# Patient Record
Sex: FEMALE | Race: WHITE | NOT HISPANIC OR LATINO | Employment: OTHER
[De-identification: names, ages, dates, MRNs, and addresses within clinical notes are randomized per-mention and may not be internally consistent; named-entity substitution may affect disease eponyms.]

---

## 2017-02-14 ENCOUNTER — RECORDS - HEALTHEAST (OUTPATIENT)
Dept: ADMINISTRATIVE | Facility: OTHER | Age: 62
End: 2017-02-14

## 2017-02-22 ENCOUNTER — COMMUNICATION - HEALTHEAST (OUTPATIENT)
Dept: TELEHEALTH | Facility: CLINIC | Age: 62
End: 2017-02-22

## 2017-02-22 ENCOUNTER — RECORDS - HEALTHEAST (OUTPATIENT)
Dept: ADMINISTRATIVE | Facility: OTHER | Age: 62
End: 2017-02-22

## 2017-02-22 ENCOUNTER — RECORDS - HEALTHEAST (OUTPATIENT)
Dept: BONE DENSITY | Facility: CLINIC | Age: 62
End: 2017-02-22

## 2017-02-22 DIAGNOSIS — M85.80 OTHER SPECIFIED DISORDERS OF BONE DENSITY AND STRUCTURE, UNSPECIFIED SITE: ICD-10-CM

## 2017-05-23 ENCOUNTER — RECORDS - HEALTHEAST (OUTPATIENT)
Dept: ADMINISTRATIVE | Facility: OTHER | Age: 62
End: 2017-05-23

## 2017-06-06 ENCOUNTER — RECORDS - HEALTHEAST (OUTPATIENT)
Dept: ADMINISTRATIVE | Facility: OTHER | Age: 62
End: 2017-06-06

## 2017-06-06 ASSESSMENT — MIFFLIN-ST. JEOR: SCORE: 1639.51

## 2017-06-11 ENCOUNTER — ANESTHESIA - HEALTHEAST (OUTPATIENT)
Dept: SURGERY | Facility: CLINIC | Age: 62
End: 2017-06-11

## 2017-06-12 ENCOUNTER — SURGERY - HEALTHEAST (OUTPATIENT)
Dept: SURGERY | Facility: CLINIC | Age: 62
End: 2017-06-12

## 2017-06-19 ENCOUNTER — THERAPY VISIT (OUTPATIENT)
Dept: PHYSICAL THERAPY | Facility: CLINIC | Age: 62
End: 2017-06-19
Payer: COMMERCIAL

## 2017-06-19 DIAGNOSIS — Z47.1 AFTERCARE FOLLOWING RIGHT KNEE JOINT REPLACEMENT SURGERY: Primary | ICD-10-CM

## 2017-06-19 DIAGNOSIS — Z96.651 AFTERCARE FOLLOWING RIGHT KNEE JOINT REPLACEMENT SURGERY: Primary | ICD-10-CM

## 2017-06-19 PROCEDURE — 97110 THERAPEUTIC EXERCISES: CPT | Mod: GP | Performed by: PHYSICAL THERAPIST

## 2017-06-19 PROCEDURE — 97161 PT EVAL LOW COMPLEX 20 MIN: CPT | Mod: GP | Performed by: PHYSICAL THERAPIST

## 2017-06-19 PROCEDURE — 97530 THERAPEUTIC ACTIVITIES: CPT | Mod: GP | Performed by: PHYSICAL THERAPIST

## 2017-06-19 ASSESSMENT — ACTIVITIES OF DAILY LIVING (ADL)
SWELLING: THE SYMPTOM PREVENTS ME FROM ALL DAILY ACTIVITIES
WEAKNESS: THE SYMPTOM PREVENTS ME FROM ALL DAILY ACTIVITIES
GIVING WAY, BUCKLING OR SHIFTING OF KNEE: THE SYMPTOM PREVENTS ME FROM ALL DAILY ACTIVITIES
GO DOWN STAIRS: I AM UNABLE TO DO THE ACTIVITY
GO UP STAIRS: I AM UNABLE TO DO THE ACTIVITY
RAW_SCORE: 0
LIMPING: THE SYMPTOM PREVENTS ME FROM ALL DAILY ACTIVITIES
KNEEL ON THE FRONT OF YOUR KNEE: I AM UNABLE TO DO THE ACTIVITY
PAIN: THE SYMPTOM PREVENTS ME FROM ALL DAILY ACTIVITIES
KNEE_ACTIVITY_OF_DAILY_LIVING_SUM: 0
SIT WITH YOUR KNEE BENT: I AM UNABLE TO DO THE ACTIVITY
WALK: I AM UNABLE TO DO THE ACTIVITY
SQUAT: I AM UNABLE TO DO THE ACTIVITY
STIFFNESS: THE SYMPTOM PREVENTS ME FROM ALL DAILY ACTIVITIES
STAND: I AM UNABLE TO DO THE ACTIVITY
HOW_WOULD_YOU_RATE_THE_CURRENT_FUNCTION_OF_YOUR_KNEE_DURING_YOUR_USUAL_DAILY_ACTIVITIES_ON_A_SCALE_FROM_0_TO_100_WITH_100_BEING_YOUR_LEVEL_OF_KNEE_FUNCTION_PRIOR_TO_YOUR_INJURY_AND_0_BEING_THE_INABILITY_TO_PERFORM_ANY_OF_YOUR_USUAL_DAILY_ACTIVITIES?: 30
KNEE_ACTIVITY_OF_DAILY_LIVING_SCORE: 0
RISE FROM A CHAIR: I AM UNABLE TO DO THE ACTIVITY
HOW_WOULD_YOU_RATE_THE_OVERALL_FUNCTION_OF_YOUR_KNEE_DURING_YOUR_USUAL_DAILY_ACTIVITIES?: SEVERELY ABNORMAL
AS_A_RESULT_OF_YOUR_KNEE_INJURY,_HOW_WOULD_YOU_RATE_YOUR_CURRENT_LEVEL_OF_DAILY_ACTIVITY?: SEVERELY ABNORMAL

## 2017-06-19 NOTE — MR AVS SNAPSHOT
After Visit Summary   6/19/2017    Rachel Penaloza    MRN: [id number]           Patient Information     Date Of Birth          1955        Visit Information        Provider Department      6/19/2017 4:40 PM Ruchi Alexander PT [facility] Physical Therapy        Today's Diagnoses     Aftercare following right knee joint replacement surgery    -  1       Follow-ups after your visit        Your next 10 appointments already scheduled     Jun 22, 2017 10:10 AM CDT   DIVINA Extremity with Ruchi Alexander PT   [facility] Physical Therapy ([facility]  )    [address] [place] [id number] [phone]            Jun 26, 2017 10:10 AM CDT   DIVINA Extremity with Ruchi Alexander PT   [facility] Therapy ([facility]  )    [address] [place] [id number] [phone]            Jun 29, 2017  2:40 PM CDT   DIVINA Extremity with Ruchi Alexander PT   [facility] Physical Therapy ([facility]  )    [address] [place] [id number] [phone]              Who to contact     If you have questions or need follow up information about today's clinic visit or your schedule please contact [facility] PHYSICAL THERAPY directly at [phone].  Normal or non-critical lab and imaging results will be communicated to you by MyChart, letter or phone within 4 business days after the clinic has received the results. If you do not hear from us within 7 days, please contact the clinic through MyChart or phone. If you have a critical or abnormal lab result, we will notify you by phone as soon as possible.  Submit refill requests through [employer] or call your pharmacy and they will forward the refill request to us. Please allow 3 business days for your refill to be completed.          Additional  "Information About Your Visit        MyChart Information     D[employer] lets you send messages to your doctor, view your test results, renew your prescriptions, schedule appointments and more. To sign up, go to [email or web address] . Click on \"Log in\" on the left side of the screen, which will take you to the Welcome page. Then click on \"Sign up Now\" on the right side of the page.     You will be asked to enter the access code listed below, as well as some personal information. Please follow the directions to create your username and password.     Your access code is: [email or web address]  Expires: 2017  8:48 AM     Your access code will  in 90 days. If you need help or a new code, please call your [place] clinic or [phone].        Care EveryWhere ID     This is your Care EveryWhere ID. This could be used by other organizations to access your [place] medical records  [id number]         Blood Pressure from Last 3 Encounters:   No data found for BP    Weight from Last 3 Encounters:   No data found for Wt              We Performed the Following     DIVINA Inital Eval Report     PT Eval, Low Complexity (75610)     Therapeutic Activities     Therapeutic Exercises        Primary Care Provider    None Specified       No primary provider on file.        Thank you!     Thank you for choosing [facility] PHYSICAL THERAPY  for your care. Our goal is always to provide you with excellent care. Hearing back from our patients is one way we can continue to improve our services. Please take a few minutes to complete the written survey that you may receive in the mail after your visit with us. Thank you!             Your Updated Medication List - Protect others around you: Learn how to safely use, store and throw away your medicines at www.disposemymeds.org.      Notice  As of 2017 11:59 PM    You have not been prescribed any medications.      "

## 2017-06-19 NOTE — LETTER
[facility] A[facility] PHYSICAL THERAPY  [address] [place] [id number] [phone]    2017    Re: Rachel Penaloza   :   1955  MRN:  [id number]   REFERRING PHYSICIAN:   Zachary Parker    [facility]LETIC [facility] PHYSICAL [facility]    Date of Initial Evaluation:  2017  Visits:  Rxs Used: 1  Reason for Referral:  Aftercare following right knee joint replacement surgery    EVALUATION SUMMARY    [facility] Initial Evaluation    Subjective:    Patient is a 61 year old female presenting with rehab right knee hpi.   Rachel Penaloza is a 61 year old female with a right knee condition.  Condition occurred with:  Degenerative joint disease (Pt. is s/p R TKA  due to progressive OA. Pt. is s/p L TKA in . She has chronic OA in multiple joints and spine as well as fibromyalgia. Pt. feels she is progressing well thus far since surgery.).  Condition occurred: for unknown reasons.  This is a new condition  17.    Patient reports pain:  Medial.  Radiates to:  Lower leg.  Pain is described as aching and sharp Episode frequency: aching is constant; sharp pain is intermittent. and reported as 9/10.  Associated symptoms:  Edema, loss of motion/stiffness and loss of strength (Pt. has chronic pitting edema in both lower legs and feet). Pain is the same all the time.  Symptoms are exacerbated by standing, walking, ascending stairs and descending stairs and relieved by ice, analgesics and rest.  Since onset symptoms are gradually improving.    Previous treatment includes physical therapy (inpatient).  There was mild improvement following previous treatment.  General health as reported by patient is poor.                   Pertinent medical history includes:  Osteoarthritis, depression, fibromyalgia and sleep disorder/apnea.  Medical allergies: yes (Latex).  Other surgeries include:  None reported.      Objective:    Standing  Alignment:    Knee:  Normal  Gait:  Mod limp on R  Gait Type:  Antalgic   Assistive Devices:  Cane  Deviations:  Knee:  Knee flexion decr R  Flexibility/Screens:   Positive screens:  Knee  Lower Extremity:  Decreased right lower extremity flexibility:  Quadriceps; Hamstrings and Gastroc     Knee Evaluation:  ROM:    AROM  Hyperextension:  Left:  0    Right: 0  Extension:  Left: 0    Right:  2  Flexion: Left: 120    Right: 92  PROM  Hyperextension: Left:   Right:  0  Extension: Left:   Right:  0  Flexion: Left:   Right:  98  Strength:   Extension:  Right: 4-/5   Pain:  Flexion:  Right: 4-/5   Pain:    Quad Set Right: Fair    Pain:  Ligament Testing:  Not Assessed  Special Tests:   Right knee negative for the following special tests:  Patellar Tracking-Abduction Medial and Patellar Tracking-Abduction Lateral  Palpation:    Right knee tenderness present at:  Medial Joint Line  Edema:  Edema of the knee: marked edema R knee.  Mobility Testing:    Proximal Tib-Fib:  Right: hypomobile    Assessment/Plan:      Patient is a 61 year old female with right side knee complaints.    Patient has the following significant findings with corresponding treatment plan.                Diagnosis 1:  S/p R TKA  Pain -  hot/cold therapy, self management and education  Decreased ROM/flexibility - manual therapy and therapeutic exercise  Decreased strength - therapeutic exercise and therapeutic activities  Impaired muscle performance - neuro re-education  Decreased function - therapeutic activities    Therapy Evaluation Codes:   1) History comprised of:   Personal factors that impact the plan of care:      None.    Comorbidity factors that impact the plan of care are:      Fibromyalgia and Overweight.     Medications impacting care: None.  2) Examination of Body Systems comprised of:   Body structures and functions that impact the plan of care:      Knee.   Activity limitations that impact the plan of care are:      Standing and  Walking.  3) Clinical presentation characteristics are:   Stable/Uncomplicated.  4) Decision-Making    Low complexity using standardized patient assessment instrument and/or measureable assessment of functional outcome.  Cumulative Therapy Evaluation is: Low complexity.    Previous and current functional limitations:  (See Goal Flow Sheet for this information)    Short term and Long term goals: (See Goal Flow Sheet for this information)     Communication ability:  Patient appears to be able to clearly communicate and understand verbal and written communication and follow directions correctly.  Treatment Explanation - The following has been discussed with the patient:   RX ordered/plan of care  Anticipated outcomes  Possible risks and side effects  This patient would benefit from PT intervention to resume normal activities.   Rehab potential is good.    Frequency:  2 X week, once daily  Duration:  for 3 weeks tapering to 1 X a week over 4 weeks  Discharge Plan:  Achieve all LTG.  Independent in home treatment program.  Reach maximal therapeutic benefit.    Thank you for your referral.    INQUIRIES  Therapist: Ruchi Alexander PT  [facility] PH[facility]ICAL THER[facility]  [address] [place] [id number]  Phone: [phone]  Fax: [phone]

## 2017-06-20 PROBLEM — Z47.1 AFTERCARE FOLLOWING RIGHT KNEE JOINT REPLACEMENT SURGERY: Status: ACTIVE | Noted: 2017-06-20

## 2017-06-20 PROBLEM — Z96.651 AFTERCARE FOLLOWING RIGHT KNEE JOINT REPLACEMENT SURGERY: Status: ACTIVE | Noted: 2017-06-20

## 2017-06-20 NOTE — PROGRESS NOTES
Subjective:    Patient is a 61 year old female presenting with rehab left ankle/foot hpi.                                      Pertinent medical history includes:  Osteoarthritis, depression, fibromyalgia and sleep disorder/apnea.  Medical allergies: yes (Latex).  Other surgeries include:  None reported.                                        Objective:    System    Physical Exam    General     ROS    Assessment/Plan:

## 2017-06-20 NOTE — PROGRESS NOTES
[facility] Initial Evaluation          Subjective:    Patient is a 61 year old female presenting with rehab right knee hpi.   Rachel Penaloza is a 61 year old female with a right knee condition.  Condition occurred with:  Degenerative joint disease (Pt. is s/p R TKA 6-12 due to progressive OA. Pt. is s/p L TKA in 2010. She has chronic OA in multiple joints and spine as well as fibromyalgia. Pt. feels she is progressing well thus far since surgery.).  Condition occurred: for unknown reasons.  This is a new condition  6-12-17.    Patient reports pain:  Medial.  Radiates to:  Lower leg.  Pain is described as aching and sharp Episode frequency: aching is constant; sharp pain is intermittent. and reported as 9/10.  Associated symptoms:  Edema, loss of motion/stiffness and loss of strength (Pt. has chronic pitting edema in both lower legs and feet). Pain is the same all the time.  Symptoms are exacerbated by standing, walking, ascending stairs and descending stairs and relieved by ice, analgesics and rest.  Since onset symptoms are gradually improving.    Previous treatment includes physical therapy (inpatient).  There was mild improvement following previous treatment.  General health as reported by patient is poor.                                              Objective:    Standing Alignment:              Knee:  Normal      Gait:  Mod limp on R  Gait Type:  Antalgic   Assistive Devices:  Cane  Deviations:  Knee:  Knee flexion decr R    Flexibility/Screens:   Positive screens:  Knee    Lower Extremity:      Decreased right lower extremity flexibility:  Quadriceps; Hamstrings and Gastroc                                                      Knee Evaluation:  ROM:    AROM    Hyperextension:  Left:  0    Right: 0  Extension:  Left: 0    Right:  2  Flexion: Left: 120    Right: 92  PROM    Hyperextension: Left:   Right:  0  Extension: Left:   Right:  0  Flexion: Left:   Right:  98      Strength:      Extension:  Right: 4-/5   Pain:  Flexion:  Right: 4-/5   Pain:      Quad Set Right: Fair    Pain:  Ligament Testing:  Not Assessed                Special Tests:       Right knee negative for the following special tests:  Patellar Tracking-Abduction Medial and Patellar Tracking-Abduction Lateral  Palpation:      Right knee tenderness present at:  Medial Joint Line  Edema:  Edema of the knee: marked edema R knee.    Mobility Testing:    Proximal Tib-Fib:  Right: hypomobile                General     ROS    Assessment/Plan:      Patient is a 61 year old female with right side knee complaints.    Patient has the following significant findings with corresponding treatment plan.                Diagnosis 1:  S/p R TKA  Pain -  hot/cold therapy, self management and education  Decreased ROM/flexibility - manual therapy and therapeutic exercise  Decreased strength - therapeutic exercise and therapeutic activities  Impaired muscle performance - neuro re-education  Decreased function - therapeutic activities    Therapy Evaluation Codes:   1) History comprised of:   Personal factors that impact the plan of care:      None.    Comorbidity factors that impact the plan of care are:      Fibromyalgia and Overweight.     Medications impacting care: None.  2) Examination of Body Systems comprised of:   Body structures and functions that impact the plan of care:      Knee.   Activity limitations that impact the plan of care are:      Standing and Walking.  3) Clinical presentation characteristics are:   Stable/Uncomplicated.  4) Decision-Making    Low complexity using standardized patient assessment instrument and/or measureable assessment of functional outcome.  Cumulative Therapy Evaluation is: Low complexity.    Previous and current functional limitations:  (See Goal Flow Sheet for this information)    Short term and Long term goals: (See Goal Flow Sheet for this information)     Communication ability:  Patient appears to be  able to clearly communicate and understand verbal and written communication and follow directions correctly.  Treatment Explanation - The following has been discussed with the patient:   RX ordered/plan of care  Anticipated outcomes  Possible risks and side effects  This patient would benefit from PT intervention to resume normal activities.   Rehab potential is good.    Frequency:  2 X week, once daily  Duration:  for 3 weeks tapering to 1 X a week over 4 weeks  Discharge Plan:  Achieve all LTG.  Independent in home treatment program.  Reach maximal therapeutic benefit.    Please refer to the daily flowsheet for treatment today, total treatment time and time spent performing 1:1 timed codes.

## 2017-06-22 ENCOUNTER — THERAPY VISIT (OUTPATIENT)
Dept: PHYSICAL THERAPY | Facility: CLINIC | Age: 62
End: 2017-06-22
Payer: COMMERCIAL

## 2017-06-22 DIAGNOSIS — Z96.651 AFTERCARE FOLLOWING RIGHT KNEE JOINT REPLACEMENT SURGERY: ICD-10-CM

## 2017-06-22 DIAGNOSIS — Z47.1 AFTERCARE FOLLOWING RIGHT KNEE JOINT REPLACEMENT SURGERY: ICD-10-CM

## 2017-06-22 PROCEDURE — 97110 THERAPEUTIC EXERCISES: CPT | Mod: GP | Performed by: PHYSICAL THERAPIST

## 2017-06-22 PROCEDURE — 97140 MANUAL THERAPY 1/> REGIONS: CPT | Mod: GP | Performed by: PHYSICAL THERAPIST

## 2017-06-26 ENCOUNTER — THERAPY VISIT (OUTPATIENT)
Dept: PHYSICAL THERAPY | Facility: CLINIC | Age: 62
End: 2017-06-26
Payer: COMMERCIAL

## 2017-06-26 DIAGNOSIS — Z96.651 AFTERCARE FOLLOWING RIGHT KNEE JOINT REPLACEMENT SURGERY: ICD-10-CM

## 2017-06-26 DIAGNOSIS — Z47.1 AFTERCARE FOLLOWING RIGHT KNEE JOINT REPLACEMENT SURGERY: ICD-10-CM

## 2017-06-26 PROCEDURE — 97110 THERAPEUTIC EXERCISES: CPT | Mod: GP | Performed by: PHYSICAL THERAPIST

## 2017-06-26 PROCEDURE — 97140 MANUAL THERAPY 1/> REGIONS: CPT | Mod: GP | Performed by: PHYSICAL THERAPIST

## 2017-06-26 NOTE — PROGRESS NOTES
Subjective:    HPI                    Objective:    System    Physical Exam    General     ROS    Assessment/Plan:      SUBJECTIVE  Subjective changes as noted by pt:   Pt. reports doing alot of activity on friday and saturday. The knee needed a break yesterday as a result so she took it very easy yesterday. She feels much better today.   Current pain level:  6/10   Changes in function:  Yes (See Goal flowsheet attached for changes in current functional level)     Adverse reaction to treatment or activity:  None    OBJECTIVE  Changes in objective findings: AROMR 0-0-100; PROM R 0-0-109. Strength R quad 4-/5; hams 4/5     ASSESSMENT  Rachel continues to require intervention to meet STG and LTG's: PT  Patient's symptoms are resolving.  Response to therapy has shown an improvement in  pain level and ROM   Progress made towards STG/LTG?  Yes (See Goal flowsheet attached for updates on achievement of STG and LTG)    PLAN  Current treatment program is being advanced to more complex exercises.    PTA/ATC plan:  N/A    Please refer to the daily flowsheet for treatment today, total treatment time and time spent performing 1:1 timed codes.

## 2017-06-26 NOTE — MR AVS SNAPSHOT
After Visit Summary   6/26/2017    Rachel Penaloza    MRN: [id number]           Patient Information     Date Of Birth          1955        Visit Information        Provider Department      6/26/2017 10:10 AM Ruchi Alexander PT [facility] Physical Therapy        Today's Diagnoses     Aftercare following right knee joint replacement surgery           Follow-ups after your visit        Your next 10 appointments already scheduled     Jun 29, 2017  2:40 PM CDT   DIVINA Extremity with Ruchi Alexander PT   [facility] Physical Therapy ([facility]  )    [address] [place] [id number]   [phone]            Jul 03, 2017 10:10 AM CDT   DIVINA Extremity with Ruchi Alexander PT   [facility] Physical Therapy ([facility]  )    [address] [place] [id number] [phone]            Jul 07, 2017 10:50 AM CDT   DIVINA Extremity with Ruchi Alexander PT   [facility] Physical Therapy ([facility]  )    [address] [place] [id number] [phone]              Who to contact     If you have questions or need follow up information about today's clinic visit or your schedule please contact [facility] PHYSICAL THERAPY directly at [phone].  Normal or non-critical lab and imaging results will be communicated to you by MyChart, letter or phone within 4 business days after the clinic has received the results. If you do not hear from us within 7 days, please contact the clinic through MyChart or phone. If you have a critical or abnormal lab result, we will notify you by phone as soon as possible.  Submit refill requests through [employer] or call your pharmacy and they will forward the refill request to us. Please allow 3 business days for your refill to be completed.          Additional  "Information About Your Visit        MyChart Information     [employer] lets you send messages to your doctor, view your test results, renew your prescriptions, schedule appointments and more. To sign up, go to [email or web address] . Click on \"Log in\" on the left side of the screen, which will take you to the Welcome page. Then click on \"Sign up Now\" on the right side of the page.     You will be asked to enter the access code listed below, as well as some personal information. Please follow the directions to create your username and password.     Your access code is: [email or web address]  Expires: 2017  8:48 AM     Your access code will  in 90 days. If you need help or a new code, please call your [place] clinic or [phone].        Care EveryWhere ID     This is your Care EveryWhere ID. This could be used by other organizations to access your [place] medical records  [id number]         Blood Pressure from Last 3 Encounters:   No data found for BP    Weight from Last 3 Encounters:   No data found for Wt              We Performed the Following     Manual Ther Tech, 1+Regions, EA 15 min     Therapeutic Exercises        Primary Care Provider    None Specified       No primary provider on file.        Equal Access to Services     EDELMIRA CLAROS : Hadii jose Avery, wasuzanne angelo, qaadrianta kaalmada presley, ten evlazquez . So [facility] [phone].    ATENCIÓN: Si habla español, tiene a romano disposición servicios gratuitos de asistencia lingüística. Llame al [phone].    We comply with applicable federal civil rights laws and [place] laws. We do not discriminate on the basis of race, color, national origin, age, disability sex, sexual orientation or gender identity.            Thank you!     Thank you for choosing [facility] PHYSICAL THERAPY  for your care. Our goal is always to provide you with excellent care. Hearing back from our " patients is one way we can continue to improve our services. Please take a few minutes to complete the written survey that you may receive in the mail after your visit with us. Thank you!             Your Updated Medication List - Protect others around you: Learn how to safely use, store and throw away your medicines at www.disposemymeds.org.      Notice  As of 6/26/2017  1:34 PM    You have not been prescribed any medications.

## 2017-06-29 ENCOUNTER — THERAPY VISIT (OUTPATIENT)
Dept: PHYSICAL THERAPY | Facility: CLINIC | Age: 62
End: 2017-06-29
Payer: COMMERCIAL

## 2017-06-29 DIAGNOSIS — Z47.1 AFTERCARE FOLLOWING RIGHT KNEE JOINT REPLACEMENT SURGERY: ICD-10-CM

## 2017-06-29 DIAGNOSIS — Z96.651 AFTERCARE FOLLOWING RIGHT KNEE JOINT REPLACEMENT SURGERY: ICD-10-CM

## 2017-06-29 PROCEDURE — 97140 MANUAL THERAPY 1/> REGIONS: CPT | Mod: GP | Performed by: PHYSICAL THERAPIST

## 2017-06-29 PROCEDURE — 97110 THERAPEUTIC EXERCISES: CPT | Mod: GP | Performed by: PHYSICAL THERAPIST

## 2017-07-03 ENCOUNTER — THERAPY VISIT (OUTPATIENT)
Dept: PHYSICAL THERAPY | Facility: CLINIC | Age: 62
End: 2017-07-03
Payer: COMMERCIAL

## 2017-07-03 DIAGNOSIS — Z47.1 AFTERCARE FOLLOWING RIGHT KNEE JOINT REPLACEMENT SURGERY: ICD-10-CM

## 2017-07-03 DIAGNOSIS — Z96.651 AFTERCARE FOLLOWING RIGHT KNEE JOINT REPLACEMENT SURGERY: ICD-10-CM

## 2017-07-03 PROCEDURE — 97110 THERAPEUTIC EXERCISES: CPT | Mod: GP | Performed by: PHYSICAL THERAPIST

## 2017-07-03 PROCEDURE — 97140 MANUAL THERAPY 1/> REGIONS: CPT | Mod: GP | Performed by: PHYSICAL THERAPIST

## 2017-07-03 NOTE — PROGRESS NOTES
Subjective:    HPI                    Objective:    System    Physical Exam    General     ROS    Assessment/Plan:      SUBJECTIVE  Subjective changes as noted by pt:   Pt. reports increased swelling in the knee this weekend with increased soreness.   Current pain level:  6/10   Changes in function:  Yes (See Goal flowsheet attached for changes in current functional level)     Adverse reaction to treatment or activity:  None    OBJECTIVE  Changes in objective findings:  AROM R 0-0-101; PROM R 0-0-111.     ASSESSMENT  Rachel continues to require intervention to meet STG and LTG's: PT  Patient is progressing as expected.  Response to therapy has shown an improvement in  pain level and ROM   Progress made towards STG/LTG?  Yes (See Goal flowsheet attached for updates on achievement of STG and LTG)    PLAN  Current treatment program is being advanced to more complex exercises.    PTA/ATC plan:  N/A    Please refer to the daily flowsheet for treatment today, total treatment time and time spent performing 1:1 timed codes.

## 2017-07-03 NOTE — MR AVS SNAPSHOT
After Visit Summary   7/3/2017    Rachel Penaloza    MRN: [id number]           Patient Information     Date Of Birth          1955        Visit Information        Provider Department      7/3/2017 10:10 AM Ruchi Alexander, PT [facility] Physical Therapy        Today's Diagnoses     Aftercare following right knee joint replacement surgery           Follow-ups after your visit        Your next 10 appointments already scheduled     Jul 07, 2017 10:50 AM CDT   DIVINA Extremity with Ruchi Alexander, PT   [facility] ([facility]  )    [address] [place] [id number] [phone]            Jul 10, 2017 10:10 AM CDT   DIVINA Extremity with Ayla Pires PTA   [facility] ([facility]  )    [address] [place] [id number] [phone]            Jul 13, 2017 10:00 AM CDT   DIVINA Extremity with Su Gee PT   [facility] ([facility]  )    [address] [place] [id number] [phone]              Who to contact     If you have questions or need follow up information about today's clinic visit or your schedule please contact [facility] PHYSICAL THERAPY directly at [phone].  Normal or non-critical lab and imaging results will be communicated to you by MyChart, letter or phone within 4 business days after the clinic has received the results. If you do not hear from us within 7 days, please contact the clinic through MyChart or phone. If you have a critical or abnormal lab result, we will notify you by phone as soon as possible.  Submit refill requests through Colto or call your pharmacy and they will forward the refill request to us. Please allow 3 business days for your refill to be completed.          Additional Information About  "Your Visit        MyChart Information     [employer] lets you send messages to your doctor, view your test results, renew your prescriptions, schedule appointments and more. To sign up, go to [email or web address] . Click on \"Log in\" on the left side of the screen, which will take you to the Welcome page. Then click on \"Sign up Now\" on the right side of the page.     You will be asked to enter the access code listed below, as well as some personal information. Please follow the directions to create your username and password.     Your access code is: [email or web address]  Expires: 2017  8:48 AM     Your access code will  in 90 days. If you need help or a new code, please call your [place] clinic or [phone].        Care EveryWhere ID     This is your Care EveryWhere ID. This could be used by other organizations to access your [place] medical records  [id number]         Blood Pressure from Last 3 Encounters:   No data found for BP    Weight from Last 3 Encounters:   No data found for Wt              We Performed the Following     Manual Ther Tech, 1+Regions, EA 15 min     Therapeutic Exercises        Primary Care Provider    None Specified       No primary provider on file.        Equal Access to Services     [facility] : Hadii jose Avery, wanonida gi, qaadrianta kaalmada presley, ten velazquez . So [facility] [phone].    ATENCIÓN: Si habla español, tiene a romano disposición servicios gratuitos de asistencia lingüística. Jet al [phone].    We comply with applicable federal civil rights laws and [place] laws. We do not discriminate on the basis of race, color, national origin, age, disability sex, sexual orientation or gender identity.            Thank you!     Thank you for choosing [facility] PHYSICAL THERAPY  for your care. Our goal is always to provide you with excellent care. Hearing back from our patients is one way we " can continue to improve our services. Please take a few minutes to complete the written survey that you may receive in the mail after your visit with us. Thank you!             Your Updated Medication List - Protect others around you: Learn how to safely use, store and throw away your medicines at www.disposemymeds.org.      Notice  As of 7/3/2017  2:07 PM    You have not been prescribed any medications.

## 2017-07-07 ENCOUNTER — THERAPY VISIT (OUTPATIENT)
Dept: PHYSICAL THERAPY | Facility: CLINIC | Age: 62
End: 2017-07-07
Payer: COMMERCIAL

## 2017-07-07 DIAGNOSIS — Z47.1 AFTERCARE FOLLOWING RIGHT KNEE JOINT REPLACEMENT SURGERY: ICD-10-CM

## 2017-07-07 DIAGNOSIS — Z96.651 AFTERCARE FOLLOWING RIGHT KNEE JOINT REPLACEMENT SURGERY: ICD-10-CM

## 2017-07-07 PROCEDURE — 97140 MANUAL THERAPY 1/> REGIONS: CPT | Mod: GP | Performed by: PHYSICAL THERAPIST

## 2017-07-07 PROCEDURE — 97110 THERAPEUTIC EXERCISES: CPT | Mod: GP | Performed by: PHYSICAL THERAPIST

## 2017-07-10 ENCOUNTER — THERAPY VISIT (OUTPATIENT)
Dept: PHYSICAL THERAPY | Facility: CLINIC | Age: 62
End: 2017-07-10
Payer: COMMERCIAL

## 2017-07-10 DIAGNOSIS — Z47.1 AFTERCARE FOLLOWING RIGHT KNEE JOINT REPLACEMENT SURGERY: ICD-10-CM

## 2017-07-10 DIAGNOSIS — Z96.651 AFTERCARE FOLLOWING RIGHT KNEE JOINT REPLACEMENT SURGERY: ICD-10-CM

## 2017-07-10 PROCEDURE — 97110 THERAPEUTIC EXERCISES: CPT | Mod: GP

## 2017-07-10 PROCEDURE — 97112 NEUROMUSCULAR REEDUCATION: CPT | Mod: GP

## 2017-07-13 ENCOUNTER — THERAPY VISIT (OUTPATIENT)
Dept: PHYSICAL THERAPY | Facility: CLINIC | Age: 62
End: 2017-07-13
Payer: COMMERCIAL

## 2017-07-13 DIAGNOSIS — Z96.651 AFTERCARE FOLLOWING RIGHT KNEE JOINT REPLACEMENT SURGERY: ICD-10-CM

## 2017-07-13 DIAGNOSIS — Z47.1 AFTERCARE FOLLOWING RIGHT KNEE JOINT REPLACEMENT SURGERY: ICD-10-CM

## 2017-07-13 PROCEDURE — 97140 MANUAL THERAPY 1/> REGIONS: CPT | Mod: GP | Performed by: PHYSICAL THERAPIST

## 2017-07-13 PROCEDURE — 97112 NEUROMUSCULAR REEDUCATION: CPT | Mod: GP | Performed by: PHYSICAL THERAPIST

## 2017-07-13 PROCEDURE — 97110 THERAPEUTIC EXERCISES: CPT | Mod: GP | Performed by: PHYSICAL THERAPIST

## 2017-07-13 NOTE — LETTER
[facility] PHYSICAL THERAPY  [address] [place] [id number]  [phone]    2017    Re: Rachel Penaloza   :   1955  MRN:  [id number]   REFERRING PHYSICIAN:   Zachary Parker    [facility] ATHLETIC [facility] PHYSICAL [facility]    Date of Initial Evaluation:  2017  Visits:  Rxs Used: 8  Reason for Referral:  Aftercare following right knee joint replacement surgery    PROGRESS  REPORT  Progress reporting period is from 17 to 17.       SUBJECTIVE  Subjective changes noted by patient:  Continues to have significant pain and stiffness.  Subjective: Woke up especially stiff and sore this morning, but significant relief with manual techniques during today's session.    Current pain level is 9/10 Current Pain level: 9/10.     Previous pain level was  9/10 Initial Pain level: 9/10.   Changes in function:  Yes, tolerance for walking (with cane) has increased to 12'. Sit to stand transitions are improving  Adverse reaction to treatment or activity: None    OBJECTIVE  Changes noted in objective findings:  Yes  Objective: PROM=0-0-113 degrees. Good quad recruitment and control with 15 SLRs AG. Balance is fair. Moderate edema throughout knee. Rash with red dots on anterior knee, thigh and leg (though this has been resolving over the past week). Gait still somewhat antalgic. Working on quad control of TKE during R stance phase.    ASSESSMENT/PLAN  Updated problem list and treatment plan: Diagnosis 1:  R TKA  Pain -  hot/cold therapy, self management and home program  Decreased ROM/flexibility - manual therapy, therapeutic exercise and home program  Decreased strength - therapeutic exercise, therapeutic activities and home program  Impaired balance - neuro re-education, therapeutic activities and home program  Inflammation - cold therapy and self management/home program  Edema - cold therapy and self management/home program  Impaired gait  - gait training  Decreased function - therapeutic activities and home program  STG/LTGs have been met or progress has been made towards goals:  Yes (See Goal flow sheet completed today.)  Assessment of Progress: The patient's condition is improving.  Self Management Plans:  Patient has been instructed in a home treatment program.  I have re-evaluated this patient and find that the nature, scope, duration and intensity of the therapy is appropriate for the medical condition of the patient.  Rachel continues to require the following intervention to meet STG and LTG's:  PT    Recommendations:  This patient would benefit from continued therapy.     Frequency:  1 X week, once daily  Duration:  for 6 weeks tapering to 2 X a month over 12 weeks    Thank you for your referral.    INQUIRIES  Therapist: Su Gee, PT  [facility]  [address] [place] [id number]  Phone: [phone]  Fax: [phone]

## 2017-07-13 NOTE — MR AVS SNAPSHOT
After Visit Summary   7/13/2017    Rachel Penaloza    MRN: [id number]           Patient Information     Date Of Birth          1955        Visit Information        Provider Department      7/13/2017 9:20 AM Su Gee, PT [facility] Physical Therapy        Today's Diagnoses     Aftercare following right knee joint replacement surgery           Follow-ups after your visit        Your next 10 appointments already scheduled     Jul 19, 2017 10:10 AM CDT   DIVINA Extremity with Ayla Pires PTA   [facility] ([facility]  )    [address] [place] [id number] [phone]            Jul 24, 2017 11:30 AM CDT   DIVINA Extremity with Ruchi Alexander, PT   [facility]    [address] [place] [id number] [phone]            Jul 28, 2017 10:10 AM CDT   DIVINA Extremity with Ruchi Alexander, PT   [facility] ([facility]  )    [address] [place] [id number] [phone]            Jul 31, 2017 10:50 AM CDT   DIVNIA Extremity with Ruchi Alexander, NORA   [facility] ([facility]  )    [address] [place] [id number] [phone]              Who to contact     If you have questions or need follow up information about today's clinic visit or your schedule please contact [facility] PHYSICAL THERAPY directly at [phone].  Normal or non-critical lab and imaging results will be communicated to you by MyChart, letter or phone within 4 business days after the clinic has received the results. If you do not hear from us within 7 days, please contact the clinic through MyChart or phone. If you have a critical or abnormal lab result, we will notify you by phone as soon  "as possible.  Submit refill requests through [employer] or call your pharmacy and they will forward the refill request to us. Please allow 3 business days for your refill to be completed.          Additional Information About Your Visit        Prism SkylabsharNew KCBX Information     [employer] lets you send messages to your doctor, view your test results, renew your prescriptions, schedule appointments and more. To sign up, go to [email or web address] . Click on \"Log in\" on the left side of the screen, which will take you to the Welcome page. Then click on \"Sign up Now\" on the right side of the page.     You will be asked to enter the access code listed below, as well as some personal information. Please follow the directions to create your username and password.     Your access code is: [email or web address]  Expires: 2017  8:48 AM     Your access code will  in 90 days. If you need help or a new code, please call your [place] clinic or [phone].        Care EveryWhere ID     This is your Care EveryWhere ID. This could be used by other organizations to access your [place] medical records  [id number]         Blood Pressure from Last 3 Encounters:   No data found for BP    Weight from Last 3 Encounters:   No data found for Wt              We Performed the Following     DIVINA PROGRESS NOTES REPORT     MANUAL THER TECH,1+REGIONS,EA 15 MIN     NEUROMUSCULAR RE-EDUCATION     THERAPEUTIC EXERCISES        Primary Care Provider    None Specified       No primary provider on file.        Equal Access to Services     [facility]: Hadii jose amaro Somaria esther, waaxda luqadaha, qaybta kaalmada presley, ten velazquez . So [facility] [phone].    ATENCIÓN: Si habla español, tiene a romano disposición servicios gratuitos de asistencia lingüística. Jet al [phone].    We comply with applicable federal civil rights laws and [place] laws. We do not discriminate on the basis of race, color, national origin, age, " disability sex, sexual orientation or gender identity.            Thank you!     Thank you for choosing [place] [facility] PHYSICAL THERAPY  for your care. Our goal is always to provide you with excellent care. Hearing back from our patients is one way we can continue to improve our services. Please take a few minutes to complete the written survey that you may receive in the mail after your visit with us. Thank you!             Your Updated Medication List - Protect others around you: Learn how to safely use, store and throw away your medicines at www.disposemymeds.org.      Notice  As of 7/13/2017 11:59 PM    You have not been prescribed any medications.

## 2017-07-15 NOTE — PROGRESS NOTES
Subjective:    HPI                    Objective:    System    Physical Exam    General     ROS    Assessment/Plan:      PROGRESS  REPORT    Progress reporting period is from 6/19/17 to 7/13/17.       SUBJECTIVE  Subjective changes noted by patient:  Continues to have significant pain and stiffness.  Subjective: Woke up especially stiff and sore this morning, but significant relief with manual techniques during today's session.    Current pain level is 9/10 Current Pain level: 9/10.     Previous pain level was  9/10 Initial Pain level: 9/10.   Changes in function:  Yes, tolerance for walking (with cane) has increased to 12'. Sit to stand transitions are improving  Adverse reaction to treatment or activity: None    OBJECTIVE  Changes noted in objective findings:  Yes  Objective: PROM=0-0-113 degrees. Good quad recruitment and control with 15 SLRs AG. Balance is fair. Moderate edema throughout knee. Rash with red dots on anterior knee, thigh and leg (though this has been resolving over the past week). Gait still somewhat antalgic. Working on quad control of TKE during R stance phase.    ASSESSMENT/PLAN  Updated problem list and treatment plan: Diagnosis 1:  R TKA  Pain -  hot/cold therapy, self management and home program  Decreased ROM/flexibility - manual therapy, therapeutic exercise and home program  Decreased strength - therapeutic exercise, therapeutic activities and home program  Impaired balance - neuro re-education, therapeutic activities and home program  Inflammation - cold therapy and self management/home program  Edema - cold therapy and self management/home program  Impaired gait - gait training  Decreased function - therapeutic activities and home program  STG/LTGs have been met or progress has been made towards goals:  Yes (See Goal flow sheet completed today.)  Assessment of Progress: The patient's condition is improving.  Self Management Plans:  Patient has been instructed in a home treatment  program.  I have re-evaluated this patient and find that the nature, scope, duration and intensity of the therapy is appropriate for the medical condition of the patient.  Rachel continues to require the following intervention to meet STG and LTG's:  PT    Recommendations:  This patient would benefit from continued therapy.     Frequency:  1 X week, once daily  Duration:  for 6 weeks tapering to 2 X a month over 12 weeks        Please refer to the daily flowsheet for treatment today, total treatment time and time spent performing 1:1 timed codes.

## 2017-07-19 ENCOUNTER — THERAPY VISIT (OUTPATIENT)
Dept: PHYSICAL THERAPY | Facility: CLINIC | Age: 62
End: 2017-07-19
Payer: COMMERCIAL

## 2017-07-19 DIAGNOSIS — Z47.1 AFTERCARE FOLLOWING RIGHT KNEE JOINT REPLACEMENT SURGERY: ICD-10-CM

## 2017-07-19 DIAGNOSIS — Z96.651 AFTERCARE FOLLOWING RIGHT KNEE JOINT REPLACEMENT SURGERY: ICD-10-CM

## 2017-07-19 PROCEDURE — 97110 THERAPEUTIC EXERCISES: CPT | Mod: GP

## 2017-07-19 PROCEDURE — 97530 THERAPEUTIC ACTIVITIES: CPT | Mod: GP

## 2017-07-19 NOTE — PROGRESS NOTES
Subjective:    HPI                    Objective:    System    Physical Exam    General     ROS    Assessment/Plan:      DISCHARGE REPORT    Progress reporting period is from 7/13/17 to 7/19/17.       SUBJECTIVE  Subjective changes noted by patient:  Pt saw Dr Parker yesterday who said that pt could finish PT and continue HEP. Plans to do some ex in a pool when she can.  Pleased that she can do full rotations on bike which was the goal from Dr Parker as well.     Current pain level is 6/10 Current Pain level: 6/10 (lower back pain, sees pain center - got injection last wk).   Knee pain less.   Previous pain level was  9/10 Initial Pain level: 9/10.   Changes in function:  Yes (See Goal flowsheet attached for changes in current functional level)  Adverse reaction to treatment or activity: None    OBJECTIVE  Changes noted in objective findings:  Yes,   Objective: PROM 0-0-114. Good quad contraction and control with 25 reps of SLR. Quad strength 4/5. Amb with knee held stiffly, better result with cues to flex. Mild+ swelling in knee and lower leg but has hx edema issues.     ASSESSMENT/PLAN  Updated problem list and treatment plan: Diagnosis 1:  S/p R TKA  Decreased ROM/flexibility - manual therapy, therapeutic exercise and home program  Decreased strength - therapeutic exercise, therapeutic activities and home program  Decreased function - therapeutic activities and home program  STG/LTGs have been met or progress has been made towards goals:  Yes (See Goal flow sheet completed today.)  Assessment of Progress: The patient's condition is improving.  Self Management Plans:  Patient has been instructed in a home treatment program.  I have re-evaluated this patient and find that the nature, scope, duration and intensity of the therapy is appropriate for the medical condition of the patient.  Rachel continues to require the following intervention to meet STG and LTG's:  PT intervention is no longer required to meet STG/LTG  unless she needs  Further guidance with HEP.    Recommendations:  This patient is ready to be discharged from therapy and continue their home treatment program. She may decide to FU with 1-2 more visits if she has trouble progressing strength and function on her own.    The progress note/discharge summary was written in collaboration with and reviewed by the physical therapist.    Please refer to the daily flowsheet for treatment today, total treatment time and time spent performing 1:1 timed codes.

## 2017-07-19 NOTE — MR AVS SNAPSHOT
"              After Visit Summary   2017    Rachel Penaloza    MRN: [id number]           Patient Information     Date Of Birth          1955        Visit Information        Provider Department      2017 10:10 AM Ayla Pires PTA [facility] Physical Therapy        Today's Diagnoses     Aftercare following right knee joint replacement surgery           Follow-ups after your visit        Who to contact     If you have questions or need follow up information about today's clinic visit or your schedule please contact [facility] directly at [phone].  Normal or non-critical lab and imaging results will be communicated to you by Kizzianghart, letter or phone within 4 business days after the clinic has received the results. If you do not hear from us within 7 days, please contact the clinic through RupeeTimest or phone. If you have a critical or abnormal lab result, we will notify you by phone as soon as possible.  Submit refill requests through [employer] or call your pharmacy and they will forward the refill request to us. Please allow 3 business days for your refill to be completed.          Additional Information About Your Visit        MyChart Information     [employer] lets you send messages to your doctor, view your test results, renew your prescriptions, schedule appointments and more. To sign up, go to [email or web address] . Click on \"Log in\" on the left side of the screen, which will take you to the Welcome page. Then click on \"Sign up Now\" on the right side of the page.     You will be asked to enter the access code listed below, as well as some personal information. Please follow the directions to create your username and password.     Your access code is: [email or web address]  Expires: 2017  8:48 AM     Your access code will  in 90 days. If you need help or a new code, please call your [place] clinic or [phone].   "      Care EveryWhere ID     This is your Care EveryWhere ID. This could be used by other organizations to access your [place] medical records  [id number]         Blood Pressure from Last 3 Encounters:   No data found for BP    Weight from Last 3 Encounters:   No data found for Wt              We Performed the Following     DIVINA Progress Notes Report     Therapeutic Activities     Therapeutic Exercises        Primary Care Provider    None Specified       No primary provider on file.        Equal Access to Services     [facility]: Hadii aad ku hadasho Soomaali, waaxda luqadaha, qaybta kaalmada adeegyada, ten morris hayaan adeeg mkrebekaromy velazquez . So [facility] [phone].    ATENCIÓN: Si habla español, tiene a romano disposición servicios gratuitos de asistencia lingüística. Jet al [phone].    We comply with applicable federal civil rights laws and [place] laws. We do not discriminate on the basis of race, color, national origin, age, disability sex, sexual orientation or gender identity.            Thank you!     Thank you for choosing [place] [facility] PHYSICAL THERAPY  for your care. Our goal is always to provide you with excellent care. Hearing back from our patients is one way we can continue to improve our services. Please take a few minutes to complete the written survey that you may receive in the mail after your visit with us. Thank you!             Your Updated Medication List - Protect others around you: Learn how to safely use, store and throw away your medicines at www.disposemymeds.org.      Notice  As of 7/19/2017 12:21 PM    You have not been prescribed any medications.

## 2017-07-19 NOTE — LETTER
[facility] ATHLETIC [facility] PHYSICAL THERAPY  [address] [place] [id number]  [phone]    2017    Re: Rachel Penaloza   :   1955  MRN:  [id number]   REFERRING PHYSICIAN:   Zachary Parker    [facility] ATHLETIC [facility] PHYSICAL [facility]  Date of Initial Evaluation:  17  Visits:  Rxs Used: 9  Reason for Referral:  Aftercare following right knee joint replacement surgery    DISCHARGE REPORT  Progress reporting period is from 17 to 17.       SUBJECTIVE  Subjective changes noted by patient:  Pt saw Dr Parker yesterday who said that pt could finish PT and continue HEP. Plans to do some ex in a pool when she can.  Pleased that she can do full rotations on bike which was the goal from Dr Parker as well.     Current pain level is 6/10 Current Pain level: 6/10 (lower back pain, sees pain center - got injection last wk).   Knee pain less.   Previous pain level was  9/10 Initial Pain level: 9/10.   Changes in function:  Yes (See Goal flowsheet attached for changes in current functional level)  Adverse reaction to treatment or activity: None    OBJECTIVE  Changes noted in objective findings:  Yes,   Objective: PROM 0-0-114. Good quad contraction and control with 25 reps of SLR. Quad strength 4/5. Amb with knee held stiffly, better result with cues to flex. Mild+ swelling in knee and lower leg but has hx edema issues.     ASSESSMENT/PLAN  Updated problem list and treatment plan: Diagnosis 1:  S/p R TKA  Decreased ROM/flexibility - manual therapy, therapeutic exercise and home program  Decreased strength - therapeutic exercise, therapeutic activities and home program  Decreased function - therapeutic activities and home program  STG/LTGs have been met or progress has been made towards goals:  Yes (See Goal flow sheet completed today.)  Assessment of Progress: The patient's condition is improving.  Self Management Plans:  Patient has been  instructed in a home treatment program.  I have re-evaluated this patient and find that the nature, scope, duration and intensity of the therapy is appropriate for the medical condition of the patient.  Rachel continues to require the following intervention to meet STG and LTG's:  PT intervention is no longer required to meet STG/LTG unless she needs  Further guidance with HEP.    Recommendations:  This patient is ready to be discharged from therapy and continue their home treatment program. She may decide to FU with 1-2 more visits if she has trouble progressing strength and function on her own.    The progress note/discharge summary was written in collaboration with and reviewed by the physical therapist.      [id number]    Please refer to the daily flowsheet for treatment today, total treatment time and time spent performing 1:1 timed codes.    Thank you for your referral.    INQUIRIES  Therapist: Ayla Pires PTA [facility]  [facility] [place] [facility]  [address] [place] [id number]  Phone: [phone]  Fax: [phone]

## 2017-09-11 ENCOUNTER — HOSPITAL ENCOUNTER (OUTPATIENT)
Dept: MAMMOGRAPHY | Facility: HOSPITAL | Age: 62
Discharge: HOME | End: 2017-09-11

## 2017-09-11 DIAGNOSIS — Z12.31 VISIT FOR SCREENING MAMMOGRAM: ICD-10-CM

## 2018-09-17 ENCOUNTER — HOSPITAL ENCOUNTER (OUTPATIENT)
Dept: MAMMOGRAPHY | Facility: CLINIC | Age: 63
Discharge: HOME | End: 2018-09-17
Attending: OBSTETRICS & GYNECOLOGY

## 2018-09-17 DIAGNOSIS — Z12.31 VISIT FOR SCREENING MAMMOGRAM: ICD-10-CM

## 2019-10-21 ENCOUNTER — HOSPITAL ENCOUNTER (OUTPATIENT)
Dept: MAMMOGRAPHY | Facility: CLINIC | Age: 64
Discharge: HOME | End: 2019-10-21

## 2019-10-21 DIAGNOSIS — Z12.31 VISIT FOR SCREENING MAMMOGRAM: ICD-10-CM

## 2020-10-23 ENCOUNTER — HOSPITAL ENCOUNTER (OUTPATIENT)
Dept: MAMMOGRAPHY | Facility: CLINIC | Age: 65
Discharge: HOME | End: 2020-10-23

## 2020-10-23 DIAGNOSIS — Z12.31 SCREENING MAMMOGRAM, ENCOUNTER FOR: ICD-10-CM

## 2020-10-29 ENCOUNTER — RECORDS - HEALTHEAST (OUTPATIENT)
Dept: ADMINISTRATIVE | Facility: OTHER | Age: 65
End: 2020-10-29

## 2020-11-02 ENCOUNTER — HOSPITAL ENCOUNTER (OUTPATIENT)
Dept: MAMMOGRAPHY | Facility: CLINIC | Age: 65
Discharge: HOME | End: 2020-11-02

## 2020-11-02 DIAGNOSIS — N64.89 BREAST ASYMMETRY: ICD-10-CM

## 2021-05-27 ENCOUNTER — RECORDS - HEALTHEAST (OUTPATIENT)
Dept: ADMINISTRATIVE | Facility: CLINIC | Age: 66
End: 2021-05-27

## 2021-05-30 VITALS — BODY MASS INDEX: 39.99 KG/M2 | WEIGHT: 240 LBS | HEIGHT: 65 IN

## 2021-06-02 ENCOUNTER — RECORDS - HEALTHEAST (OUTPATIENT)
Dept: ADMINISTRATIVE | Facility: CLINIC | Age: 66
End: 2021-06-02

## 2021-06-11 NOTE — ANESTHESIA PROCEDURE NOTES
Spinal Block    Start time: 6/12/2017 7:34 AM  End time: 6/12/2017 7:38 AM    Staffing:  Performing  Anesthesiologist: RIKI GALVEZ    Preanesthetic Checklist  Completed: patient identified, risks, benefits, and alternatives discussed, timeout performed, consent obtained, airway assessed, oxygen available, suction available, emergency drugs available and hand hygiene performed  Spinal Block  Patient position: sitting  Prep: ChloraPrep  Patient monitoring: heart rate, continuous pulse ox and blood pressure  Approach: midline  Location: L3-4  Injection technique: single-shot  Needle type: pencil-tip   Needle gauge: 24 G      Additional Notes:  Easily placed.

## 2021-06-11 NOTE — ANESTHESIA POSTPROCEDURE EVALUATION
Patient: Rachel Penaloza  RIGHT TOTAL KNEE ARTHROPLASTY  Anesthesia type: spinal    Patient location: PACU  Last vitals:   Vitals:    06/12/17 1015   BP: 113/54   Pulse: (!) 57   Resp: 19   Temp:    SpO2: 100%     Post vital signs: stable  Level of consciousness: awake and responds to simple questions  Post-anesthesia pain: pain controlled  Post-anesthesia nausea and vomiting: no  Pulmonary: unassisted, return to baseline  Cardiovascular: stable and blood pressure at baseline  Hydration: adequate  Anesthetic events: no    QCDR Measures:  ASA# 11 - Nelli-op Cardiac Arrest: ASA11B - Patient did NOT experience unanticipated cardiac arrest  ASA# 12 - Nelli-op Mortality Rate: ASA12B - Patient did NOT die  ASA# 13 - PACU Re-Intubation Rate: ASA13B - Patient did NOT require a new airway mgmt  ASA# 10 - Composite Anes Safety: ASA10A - No serious adverse event  ASA# 38 - New Corneal Injury: ASA38A - No new exposure keratitis or corneal abrasion in PACU    Additional Notes:

## 2021-06-11 NOTE — ANESTHESIA CARE TRANSFER NOTE
Last vitals:   Vitals:    06/12/17 0928   BP: 107/55   Pulse: 76   Resp: 15   Temp: 36.4  C (97.6  F)   SpO2: 100%     Patient's level of consciousness is awake  Spontaneous respirations: yes  Maintains airway independently: yes  Dentition unchanged: yes  Oropharynx: oropharynx clear of all foreign objects    QCDR Measures:  ASA# 20 - Surgical Safety Checklist: ASA20A - Safety Checks Done  PQRS# 430 - Adult PONV Prevention: 4558F - Pt received => 2 anti-emetic agents (different classes) preop & intraop  ASA# 8 - Peds PONV Prevention: NA - Not pediatric patient, not GA or 2 or more risk factors NOT present  PQRS# 424 - Nelli-op Temp Management: 4559F - At least one body temp DOCUMENTED => 35.5C or 95.9F within required timeframe  PQRS# 426 - PACU Transfer Protocol: - Transfer of care checklist used  ASA# 14 - Acute Post-op Pain: ASA14B - Patient did NOT experience pain >= 7 out of 10

## 2021-06-11 NOTE — ANESTHESIA PREPROCEDURE EVALUATION
Anesthesia Evaluation      Patient summary reviewed   History of anesthetic complications     Airway   Mallampati: II   Pulmonary - normal exam    breath sounds clear to auscultation  (+) sleep apnea,                          Cardiovascular - normal exam  (+) hypertension, ,     Rhythm: regular  Rate: normal,         Neuro/Psych - negative ROS     Endo/Other - negative ROS      GI/Hepatic/Renal    (+) GERD,   chronic renal disease,      Other findings: PONV. Recent cold, started treatment with antibiotics last week. Mild cough residual. Dr. Parker aware. Sounds clear in lungs bilaterally. No wheezes.      Dental                         Anesthesia Plan  Planned anesthetic: spinal  Explained after spinal that sedation will be light secondary to residual cough/sleep apnea. Patient aware and is happy to proceed with spinal and light sedation. GA backup.  ASA 3     Anesthetic plan and risks discussed with: patient    Post-op plan: routine recovery

## 2021-07-13 ENCOUNTER — RECORDS - HEALTHEAST (OUTPATIENT)
Dept: ADMINISTRATIVE | Facility: CLINIC | Age: 66
End: 2021-07-13

## 2021-07-21 ENCOUNTER — RECORDS - HEALTHEAST (OUTPATIENT)
Dept: ADMINISTRATIVE | Facility: CLINIC | Age: 66
End: 2021-07-21

## 2021-07-22 ENCOUNTER — RECORDS - HEALTHEAST (OUTPATIENT)
Dept: SCHEDULING | Facility: CLINIC | Age: 66
End: 2021-07-22

## 2021-07-22 DIAGNOSIS — Z12.31 OTHER SCREENING MAMMOGRAM: ICD-10-CM

## 2021-09-04 ENCOUNTER — HEALTH MAINTENANCE LETTER (OUTPATIENT)
Age: 66
End: 2021-09-04

## 2021-10-25 ENCOUNTER — ANCILLARY PROCEDURE (OUTPATIENT)
Dept: MAMMOGRAPHY | Facility: CLINIC | Age: 66
End: 2021-10-25
Attending: INTERNAL MEDICINE
Payer: COMMERCIAL

## 2021-10-25 DIAGNOSIS — Z12.31 VISIT FOR SCREENING MAMMOGRAM: ICD-10-CM

## 2021-10-25 PROCEDURE — 77063 BREAST TOMOSYNTHESIS BI: CPT

## 2022-07-10 ENCOUNTER — NURSE TRIAGE (OUTPATIENT)
Dept: NURSING | Facility: CLINIC | Age: 67
End: 2022-07-10

## 2022-07-10 NOTE — TELEPHONE ENCOUNTER
PCP: Golden Galaviz. Seen today [facility]  10 yrs ago hysterectomy  Past hx of 5-6 episodes of kidney stones.  Beginning Friday jadyn she began urinating blood.   Yesterday pain was so severe that she was doubled over. It subsided. She went to  today. Still has blood in urine. She was sent to [facility] for CT scan which was OK. She is pretty sure it is a kidney stone because of the pain, but tests are not confirming this.   Dr Chen has retired. She would like another opinion--[facility].   Call transferred to  for an appointment as requested by patient.     Janiya Blanca RN Triage Nurse Advisor 6:37 PM 7/10/2022

## 2022-10-22 ENCOUNTER — HEALTH MAINTENANCE LETTER (OUTPATIENT)
Age: 67
End: 2022-10-22

## 2022-10-26 ENCOUNTER — ANCILLARY PROCEDURE (OUTPATIENT)
Dept: MAMMOGRAPHY | Facility: CLINIC | Age: 67
End: 2022-10-26
Attending: OBSTETRICS & GYNECOLOGY
Payer: COMMERCIAL

## 2022-10-26 DIAGNOSIS — Z12.31 SCREENING MAMMOGRAM, ENCOUNTER FOR: ICD-10-CM

## 2022-10-26 PROCEDURE — 77067 SCR MAMMO BI INCL CAD: CPT

## 2023-06-01 ENCOUNTER — HEALTH MAINTENANCE LETTER (OUTPATIENT)
Age: 68
End: 2023-06-01

## 2023-10-27 ENCOUNTER — ANCILLARY PROCEDURE (OUTPATIENT)
Dept: MAMMOGRAPHY | Facility: CLINIC | Age: 68
End: 2023-10-27
Attending: STUDENT IN AN ORGANIZED HEALTH CARE EDUCATION/TRAINING PROGRAM
Payer: COMMERCIAL

## 2023-10-27 DIAGNOSIS — Z12.31 VISIT FOR SCREENING MAMMOGRAM: ICD-10-CM

## 2023-10-27 PROCEDURE — 77067 SCR MAMMO BI INCL CAD: CPT

## 2024-06-02 ENCOUNTER — HEALTH MAINTENANCE LETTER (OUTPATIENT)
Age: 69
End: 2024-06-02

## 2024-07-12 ENCOUNTER — TRANSCRIBE ORDERS (OUTPATIENT)
Dept: VASCULAR SURGERY | Facility: CLINIC | Age: 69
End: 2024-07-12
Payer: COMMERCIAL

## 2024-07-12 ENCOUNTER — TELEPHONE (OUTPATIENT)
Dept: VASCULAR SURGERY | Facility: CLINIC | Age: 69
End: 2024-07-12
Payer: COMMERCIAL

## 2024-07-12 DIAGNOSIS — I89.0 LYMPHEDEMA OF LEG: Primary | ICD-10-CM

## 2024-07-12 NOTE — TELEPHONE ENCOUNTER
Patient called to schedule a consultation for fly LE swelling, blistering, weeping, and skin changes. She states the referral should be coming from Nick Garcia at [facility]; nothing has been received yet. Writer asked that patient have [facility] fax referral and office notes for review to: [phone].

## 2024-09-10 ENCOUNTER — OFFICE VISIT (OUTPATIENT)
Dept: VASCULAR SURGERY | Facility: CLINIC | Age: 69
End: 2024-09-10
Attending: PHYSICIAN ASSISTANT
Payer: COMMERCIAL

## 2024-09-10 VITALS
HEART RATE: 71 BPM | WEIGHT: 293 LBS | OXYGEN SATURATION: 94 % | RESPIRATION RATE: 18 BRPM | HEIGHT: 65 IN | BODY MASS INDEX: 48.82 KG/M2 | DIASTOLIC BLOOD PRESSURE: 90 MMHG | TEMPERATURE: 98.2 F | SYSTOLIC BLOOD PRESSURE: 153 MMHG

## 2024-09-10 DIAGNOSIS — I87.2 CHRONIC VENOUS INSUFFICIENCY OF LOWER EXTREMITY: ICD-10-CM

## 2024-09-10 DIAGNOSIS — I87.2 STASIS DERMATITIS OF BOTH LEGS: ICD-10-CM

## 2024-09-10 DIAGNOSIS — I89.0 ACQUIRED LYMPHEDEMA OF LOWER EXTREMITY: Primary | ICD-10-CM

## 2024-09-10 DIAGNOSIS — E66.813 CLASS 3 OBESITY: ICD-10-CM

## 2024-09-10 PROCEDURE — 99204 OFFICE O/P NEW MOD 45 MIN: CPT | Performed by: HOSPITALIST

## 2024-09-10 PROCEDURE — G0463 HOSPITAL OUTPT CLINIC VISIT: HCPCS | Performed by: HOSPITALIST

## 2024-09-10 RX ORDER — PROPRANOLOL HYDROCHLORIDE 120 MG/1
120 CAPSULE, EXTENDED RELEASE ORAL
COMMUNITY
Start: 2024-01-12

## 2024-09-10 RX ORDER — ROSUVASTATIN CALCIUM 20 MG/1
1 TABLET, COATED ORAL DAILY
COMMUNITY
Start: 2024-01-12

## 2024-09-10 RX ORDER — MAGNESIUM 64 MG (MAGNESIUM CHLORIDE) TABLET,DELAYED RELEASE
535 DAILY
COMMUNITY

## 2024-09-10 RX ORDER — BENZONATATE 100 MG/1
CAPSULE ORAL
COMMUNITY

## 2024-09-10 RX ORDER — PROMETHAZINE HYDROCHLORIDE 25 MG/1
TABLET ORAL
COMMUNITY

## 2024-09-10 RX ORDER — TRIAMCINOLONE ACETONIDE 1 MG/G
CREAM TOPICAL 2 TIMES DAILY PRN
Qty: 30 G | Refills: 3 | Status: SHIPPED | OUTPATIENT
Start: 2024-09-10

## 2024-09-10 RX ORDER — AMMONIUM LACTATE 12 G/100G
CREAM TOPICAL
COMMUNITY
Start: 2023-02-27

## 2024-09-10 ASSESSMENT — PAIN SCALES - GENERAL: PAINLEVEL: NO PAIN (0)

## 2024-09-10 NOTE — PROGRESS NOTES
"[facility]        Patient is here for a consult to discuss Lymphedema/BLE swelling. The patient states he/she does not wear compressions. Swelling is observed in both lower extremities.    Pt is currently taking Statin.    BP (!) 153/90   Pulse 71   Temp 98.2  F (36.8  C)   Resp 18   Ht 5' 5\" (1.651 m)   Wt 293 lb 6.4 oz (133.1 kg)   SpO2 94%   BMI 48.82 kg/m      The provider has been notified that the patient has concerns of BLE swelling.     Questions patient would like addressed today are: N/A.    Refills are needed: N/A    Has homecare services and agency name:  No           "

## 2024-09-10 NOTE — PATIENT INSTRUCTIONS
Windy Ramos,    Thank you for entrusting your care with us today. After your visit today with MD Lilibeth Shane this is the plan that was discussed at your appointment.    An order for triamcinolone was sent to your pharmacy. Apply a thin layer over the affected area and wash your hands after use.  Use for no more than 10 days at a time.     Start wearing double layer tubular compression daily until you make a plan with lymphedema therapy.      Go to lymphedema therapy.  If you do not hear from them in the next couple of business days,.please call them to schedule.     Follow up in 3-4 months.     A prescription was written for custom compression.  You will want to wait for lymphedema therapy to tell you when this should be scheduled.  Call [facility] [phone] for an appointment with the fitter.      I am including additional information on these things and our contact information if you have any questions or concerns.   Please do not hesitate to reach out to us if you felt we did not answer your questions or you are unsure of the treatment plan after your visit today. Our number is [phone].Thank you for trusting us with your care.         Again thank you for your time.

## 2024-09-13 ENCOUNTER — TELEPHONE (OUTPATIENT)
Dept: VASCULAR SURGERY | Facility: CLINIC | Age: 69
End: 2024-09-13
Payer: COMMERCIAL

## 2024-09-13 NOTE — TELEPHONE ENCOUNTER
Caller: Rachel    Provider: MD Lilibeth Shane    Detailed reason for call: Rachel is calling stating the tube she got from the pharmacy will only last 4 days, not the 10 days that was prescribed. She is wondering what she should do or if more can be ordered?    Best phone number to contact: [phone]    Best time to contact: any    Ok to leave a detailed message: Yes    Ok to speak to authorized person if needed: No      (Noted to patient if reason is related to wound or incision, to please send a photo via email or [employer])

## 2024-09-13 NOTE — TELEPHONE ENCOUNTER
Pt was updated to only apply a thin layer over the affected area and wash your hands after use. Use for no more than 10 days at a time.     Told her that the RX came with refills.

## 2024-09-23 NOTE — PROGRESS NOTES
VASCULAR MEDICINE CONSULT NOTE            Date of Service: 9/10/2024      Primary Care Provider: Jose Juarez  Referring provider;  Nick Thomas      Reason for the visit/chief complaint:   Lower extremity swelling      HPI:  Rachel Penaloza is a pleasant 69 year old female who presents to our Vascular Medicine clinic for the above mentioned reason.    Ms. Penaloza has medical history of hypertension, dyslipidemia and obesity.  Patient states that lower extremity edema actually started decades ago in her early 20s.  Remembers that she was put on diuretics since she was in her early 20s.  She has been on and off of diuretics throughout the years.  She has had 2 pregnancies and her lower extremity swelling was worse after her second and last pregnancy.  Since then, she has had on and off swelling.  Also when she was younger, she was very thin in general as a child and growing up.  She was 95 pounds when she got .  She started gaining the weight in her mid 40s and this has been gradually happening over the past 25 years.  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    She worked at this job the majority of her life.    For the past 8 years, she started getting what she is reporting as cellulitis every summer.  Reports that these episodes typically improve with prednisone?.    At some point, she was officially evaluated with the possibility of venous insufficiency and lymphedema.  She has not necessarily been wearing any compression therapy consistently.  She has had a lymphedema pump since 2016 and reports significant improvement in her swelling when she uses the pump for an hour.  However, due to the fact that her legs would feel fairly quickly after couple hours from using the pump, she stopped using it and can go now weeks without using it.    Denies any DVT, varicose veins or superficial vein thrombosis.    Reports her mother had leg swelling but was also on dialysis.  Otherwise no known family  "history of lymphedema or venous insufficiency.    Non-smoker.    In 2020, she was seen by vascular surgery through [facility] and she was referred to lymphedema therapy.  Venous competency study from that time completed 9//2020 showed focal reflux in the left GSV at the knee with no other venous reflux detected bilaterally.        REVIEW OF SYSTEMS:    A 12 point ROS was reviewed and is negative except what is mentioned in HPI.       Past medical history, surgical history, medications, family history, social history and allergies were reviewed. Pertinent points mentioned under HPI.        OBJECTIVE:    Vital signs:  BP (!) 153/90   Pulse 71   Temp 98.2  F (36.8  C)   Resp 18   Ht 5' 5\"   Wt 293 lb 6.4 oz   SpO2 94%   BMI 48.82 kg/m    Wt Readings from Last 1 Encounters:   09/10/24 293 lb 6.4 oz     Body mass index is 48.82 kg/m .    Physical exam:  General appearance: Pleasant female in no apparent distress.    \Neck: No JVD  Heart: RRR  Chest: CTA  Abdomen: soft, non tender  Extremities: Bilateral lower extremity pitting edema is noted starting from the knee down with positive stemmer's sign and prominent dorsal hump.  Skin: Stasis dermatitis noted bilaterally.  No wounds.  Neurological: Alert, awake and oriented           DIAGNOSTIC STUDIES:   Labs and diagnostics reviewed including outside records. Pertinent points are mentioned under HPI and assessment and plan sections.          ASSESSMENT AND PLAN:    Chronic venous insufficiency with longstanding lower extremity edema  Acquired lymphedema of bilateral lower extremity  Stasis dermatitis   Obesity class III    Mr. Ms. Penaloza has evidence of longstanding lower extremity edema. She most likely has chronic venous insufficiency with acquired lymphedema although primary lymphedema is a possibility given early onset in her 20s.  She has not been following any consistent compression therapy over the years with only on and off diuretic therapy over the past 40 " years.  Has used lymphedema pump infrequently.  We spent time discussing the importance of compression therapy, leg elevation, skin care, exercising and weight loss and discussed both lymphedema and venous insufficiency.    We will refer her to lymphedema therapy for CDT. She will benefit from custom-made Velcro compression and a prescription will be written for her today.  She was encouraged to hold on to the prescription until she concludes her lymphedema therapy sessions.  At some point, she might need updated lymphedema pump however we discussed that lymphedema pump cannot be expected to make significant ongoing difference without proper compression therapy.    We will prescribe triamcinolone for her stasis dermatitis.  I believe what she meant by having cellulitis each year is a flare of her stasis dermatitis that is typically treated by steroids rather than actual infection.    She is already working with weight management clinic to lose weight.  She was encouraged to continue.      Recommendations:  Referral to lymphedema therapy for CDT.  Use triamcinolone to the affected area of stasis dermatitis (apply a thin layer over the affected area and wash your hands after use. Use for no more than 10 days at a time).    Custom made Velcro compression prescription given to the patient but she was instructed to not get fitted for it until she completes lymphedema therapy.  Discussed leg elevation, calf muscle pump exercises and encouraged weight loss.  Working with weight loss clinic.  Follow-up in 3-4 months.    Was a pleasure meeting Ms. Penaloza in our clinic today.      Lilibeth Shane MD, [facility]  Vascular Medicine  September 10, 2024

## 2024-10-03 ENCOUNTER — THERAPY VISIT (OUTPATIENT)
Dept: PHYSICAL THERAPY | Facility: REHABILITATION | Age: 69
End: 2024-10-03
Attending: HOSPITALIST
Payer: COMMERCIAL

## 2024-10-03 DIAGNOSIS — Z47.1 AFTERCARE FOLLOWING RIGHT KNEE JOINT REPLACEMENT SURGERY: Primary | ICD-10-CM

## 2024-10-03 DIAGNOSIS — I89.0 ACQUIRED LYMPHEDEMA OF LOWER EXTREMITY: ICD-10-CM

## 2024-10-03 DIAGNOSIS — Z96.651 AFTERCARE FOLLOWING RIGHT KNEE JOINT REPLACEMENT SURGERY: Primary | ICD-10-CM

## 2024-10-03 PROCEDURE — 97161 PT EVAL LOW COMPLEX 20 MIN: CPT | Mod: GP | Performed by: PHYSICAL THERAPIST

## 2024-10-03 PROCEDURE — 97535 SELF CARE MNGMENT TRAINING: CPT | Mod: GP | Performed by: PHYSICAL THERAPIST

## 2024-10-03 PROCEDURE — 97140 MANUAL THERAPY 1/> REGIONS: CPT | Mod: GP | Performed by: PHYSICAL THERAPIST

## 2024-10-03 NOTE — PROGRESS NOTES
"PHYSICAL THERAPY EVALUATION  Type of Visit: Evaluation              Patient reports swelling has been present since she was in 20's and started to increase after her 2nd pregnancy. Weight has increased and so has the swelling. She was diagnosed with venous insufficiency and lymphedema has has been treated in the past. She reports every late spring she gets an \"infection\" and treat it with steroids.   She saw the MD at the vascular clinic and she feels that there is chronic insufficiency and acquired lymphedema.   She has tried compression wraps in the past without success, has compression socks, night time compression at times, she has a pump for at home, she has done 1-2 hours at time, she does this most often. Better int he AM but gets more swollen as the days go on.     Subjective       Presenting condition or subjective complaint: To reduce the swollen ankles, and feet or to make it go away permanently  Date of onset: 09/10/24    Relevant medical history:     Dates & types of surgery:      Prior diagnostic imaging/testing results: CT scan; Bone scan     Prior therapy history for the same diagnosis, illness or injury: Yes Leg wraps - they don t benefit me at all. This was done through [facility] in [place] approximately 3 to 5 years ago    Prior Level of Function  Transfers: Independent  Ambulation: Independent  ADL: Independent    Living Environment  Social support: With a significant other or spouse   Type of home: Apartment/condo   Stairs to enter the home: No       Ramp: No   Stairs inside the home: No       Help at home: Home management tasks (cooking, cleaning)  Equipment owned: Four-point cane; Walker with wheels; Grab bars; Bath bench     Employment: No    Hobbies/Interests:      Patient goals for therapy: To try and keep it under control, so I m not so swollen all the time    Pain assessment: See objective evaluation for additional pain details     Objective       EDEMA EVALUATION  Additional history:  Body " part affected by edema: Legs, ankles and feet sometimes hands  If cancer related, treatment:    If not cancer related, problems with veins or cause of swelling:    Distance able to walk: 1/2 block or lesd  Time able to stand: 10 minutes  Sensation problems in hands/feet: No    Edema etiology: Chronic Venous Insufficiency, Surgery, TKA    FUNCTIONAL SCALES   Lymphedema Life Impact Scale (LLIS): 60    Cognitive Status Examination  Orientation: Oriented to person, place and time   Level of Consciousness: Alert  Follows Commands and Answers Questions: 100% of the time  Personal Safety and Judgement: Intact  Memory: Intact    EDEMA  Skin Condition: Dryness, Hemosiderin deposits, Intact, Pitting  Scar: Yes  Anterior knees from TKA   Capillary Refill: Symmetrical  Dorsal Pedal Pulse: Symmetrical  Stemmer Sign: +  Ulceration: No    GIRTH MEASUREMENTS: Refer to separate girth measurement flowsheet for specific measurements.    VOLUME LE  Right LE Total Volume (mL): 7910.49  Left LE Total Volume (mL): 8813.24  LE Limb Comparison:  Identify AFFECTED Limb Volume-Vi (ml): 8813.2  Identify UNAFFECTED Limb Volume-Vu (ml): 7910.5  % LE Swelling: 10.2  LE Limb % Difference: 11.41    RANGE OF MOTION: LE ROM WFL  UE ROM WFL  STRENGTH: UE Strength WFL, LE Strength WFL  POSTURE: Sitting Posture: Rounded shoulders  ACTIVITIES OF DAILY LIVING: WNL    Assessment & Plan   CLINICAL IMPRESSIONS  Medical Diagnosis: Lymphedema    Treatment Diagnosis: Lymphedema   Impression/Assessment: Patient is a 69 year old female with complaints of bilateral LE lymphedema that has been present since she was in her 20's and progressed after her 2nd pregnancy and then again after her TKA surgeries..  The following significant findings have been identified: Decreased ROM/flexibility, Decreased strength, Edema, Impaired muscle performance, and Decreased activity tolerance. These impairments interfere with their ability to perform recreational activities and  community mobility as compared to previous level of function.     Clinical Decision Making (Complexity):  Clinical Presentation: Stable/Uncomplicated  Clinical Presentation Rationale: based on medical and personal factors listed in PT evaluation  Clinical Decision Making (Complexity): Low complexity    PLAN OF CARE  Treatment Interventions:  Interventions: Manual Therapy, Neuromuscular Re-education, Therapeutic Activity, Therapeutic Exercise, Self-Care/Home Management, Gradient Compression Bandaging    Long Term Goals     PT Goal 1  Goal Identifier: HEP and self care/home management  Goal Description: Patient will be independent in a HEP and other self care/home management technique such as compression, skin care and self MLD/use of pump for ongoing symptom management in 90 days  Target Date: 01/01/25  PT Goal 2  Goal Identifier: Volumes  Goal Description: Patient will demonstrate a decrease in LE volumes by 5% for decrease risk for infection and improvement in overall mobility and donning socks/shoes and LE clothing in 90 days  Target Date: 01/01/25      Frequency of Treatment: 1-2 times per week  Duration of Treatment: 90 days    Recommended Referrals to Other Professionals:  none - possible need for referral to prosthetics and orthotics for fitting of compression garments   Education Assessment:   Learner/Method: Patient;Listening;Reading;Demonstration;No Barriers to Learning    Risks and benefits of evaluation/treatment have been explained.   Patient/Family/caregiver agrees with Plan of Care.     Evaluation Time:     PT Eval, Low Complexity Minutes (94208): 30   Present: Not applicable     Signing Clinician: Rachele Flores PT        [facility]                                                                                   OUTPATIENT PHYSICAL THERAPY      PLAN OF TREATMENT FOR OUTPATIENT REHABILITATION   Patient's Last Name, First Name, Rachel Jerome Date of  Birth:  1955   Provider's Name   [facility]   Medical Record No.  [id number]     Onset Date: 09/10/24  Start of Care Date: 10/03/24     Medical Diagnosis:  Lymphedema      PT Treatment Diagnosis:  Lymphedema Plan of Treatment  Frequency/Duration: 1-2 times per week/ 90 days    Certification date from 10/03/24 to 01/01/25         See note for plan of treatment details and functional goals     Rachele Flores, PT                         I CERTIFY THE NEED FOR THESE SERVICES FURNISHED UNDER        THIS PLAN OF TREATMENT AND WHILE UNDER MY CARE     (Physician attestation of this document indicates review and certification of the therapy plan).              Referring Provider:  Lilibeth Shane    Initial Assessment  See Epic Evaluation- Start of Care Date: 10/03/24

## 2024-10-16 ENCOUNTER — THERAPY VISIT (OUTPATIENT)
Dept: PHYSICAL THERAPY | Facility: REHABILITATION | Age: 69
End: 2024-10-16
Attending: HOSPITALIST
Payer: COMMERCIAL

## 2024-10-16 DIAGNOSIS — I89.0 ACQUIRED LYMPHEDEMA OF LOWER EXTREMITY: Primary | ICD-10-CM

## 2024-10-16 DIAGNOSIS — Z96.651 AFTERCARE FOLLOWING RIGHT KNEE JOINT REPLACEMENT SURGERY: ICD-10-CM

## 2024-10-16 DIAGNOSIS — Z47.1 AFTERCARE FOLLOWING RIGHT KNEE JOINT REPLACEMENT SURGERY: ICD-10-CM

## 2024-10-16 PROCEDURE — 97535 SELF CARE MNGMENT TRAINING: CPT | Mod: GP | Performed by: PHYSICAL THERAPIST

## 2024-10-16 PROCEDURE — 97140 MANUAL THERAPY 1/> REGIONS: CPT | Mod: GP | Performed by: PHYSICAL THERAPIST

## 2024-10-23 ENCOUNTER — THERAPY VISIT (OUTPATIENT)
Dept: PHYSICAL THERAPY | Facility: REHABILITATION | Age: 69
End: 2024-10-23
Attending: HOSPITALIST
Payer: COMMERCIAL

## 2024-10-23 DIAGNOSIS — I89.0 ACQUIRED LYMPHEDEMA OF LOWER EXTREMITY: Primary | ICD-10-CM

## 2024-10-23 DIAGNOSIS — Z96.651 AFTERCARE FOLLOWING RIGHT KNEE JOINT REPLACEMENT SURGERY: ICD-10-CM

## 2024-10-23 DIAGNOSIS — Z47.1 AFTERCARE FOLLOWING RIGHT KNEE JOINT REPLACEMENT SURGERY: ICD-10-CM

## 2024-10-23 PROCEDURE — 97140 MANUAL THERAPY 1/> REGIONS: CPT | Mod: GP | Performed by: PHYSICAL THERAPIST

## 2024-10-24 NOTE — PROGRESS NOTES
10/23/24 0500   Appointment Info   Signing clinician's name / credentials Rachele Flores, PT, DPT, CLT-CRISTIAN   Visits Used 3   Medical Diagnosis Lymphedema   PT Tx Diagnosis Lymphedema   Progress Note/Certification   Start of Care Date 10/03/24   Onset of illness/injury or Date of Surgery 09/10/24   Therapy Frequency 1-2 times per week   Predicted Duration 90 days   Certification date from 10/03/24   Certification date to 01/01/25   Progress Note Completed Date 10/03/24       Present No   GOALS   PT Goals 2   PT Goal 1   Goal Identifier HEP and self care/home management   Goal Description Patient will be independent in a HEP and other self care/home management technique such as compression, skin care and self MLD/use of pump for ongoing symptom management in 90 days   Target Date 01/01/25   PT Goal 2   Goal Identifier Volumes   Goal Description Patient will demonstrate a decrease in LE volumes by 5% for decrease risk for infection and improvement in overall mobility and donning socks/shoes and LE clothing in 90 days   Target Date 01/01/25   Subjective Report   Subjective Report Patient reprots she feels she would be better at participating in therapy after the first of the year  and would like today to be the last session. She has started to use her pump almost every night for 1 hour, she has not started to wear compression yet. is interested in getting fitted for her new compression.   Treatment Interventions (PT)   Interventions Manual Therapy;Self Care/Home Management   Manual Therapy   Manual Therapy: Mobilization, MFR, MLD, friction massage minutes (12956) 55   Manual Therapy Manual Therapy 2   Manual Therapy 1 MLD   Manual Therapy 1 - Details performed MLD to B LEs: with inguinals and clearing each LEs focused on knees and lower legs to ankles and feet   Manual Therapy 2 compression   Manual Therapy 2 - Details discussed compression again, she has some compression stockings that are older  but in good shape, she has orders for compression in the system, she is interested in velcro compression for now - given number for fitter to ser up an appointment today .   Patient Response/Progress well tolerated, improvement in tension of skin and color/texture of skin after MLD.   Self Care/home Management   Self Care 1 continued education on compression wraps vs stockings in role of reduction of lymphedema vs management of lymphedema, reivewed use of her pump at home and instructed to resume at least daily for 60 min at at time vs 1-2 times per week for 3 hours, use of her pump she has and apply compression stockings daily to decrease edema and manage overall. reviewed skin care with patient. Discussed POC and possiblly holding therapy until new year for insurance purposes.   Education   Learner/Method Patient;Listening;Reading;Demonstration;No Barriers to Learning   Plan   Home program daily use of pump and wearing compression stockings   Updates to plan of care requested orders for compression stockings from referring MD today - has orders in system and given nunber to call for fitting when she is able   Plan for next session Hold chart, she would like to follow up again in the February time and work on compression and pump at home at this time   Comments   Comments Plan to DC today. Will reassess once she returns to clinic in February.   Total Session Time   Timed Code Treatment Minutes 55   Total Treatment Time (sum of timed and untimed services) 55         DISCHARGE  Reason for Discharge: Patient chooses to discontinue therapy.    Equipment Issued: none    Discharge Plan: Patient to continue home program. Get fitted for new compression     Referring Provider:  Lilibeth Flores, PT, DPT, CLELI-CRISTIAN

## 2024-10-30 ENCOUNTER — ANCILLARY PROCEDURE (OUTPATIENT)
Dept: MAMMOGRAPHY | Facility: CLINIC | Age: 69
End: 2024-10-30
Attending: STUDENT IN AN ORGANIZED HEALTH CARE EDUCATION/TRAINING PROGRAM
Payer: COMMERCIAL

## 2024-10-30 DIAGNOSIS — Z12.31 ENCOUNTER FOR SCREENING MAMMOGRAM FOR BREAST CANCER: ICD-10-CM

## 2024-10-30 PROCEDURE — 77063 BREAST TOMOSYNTHESIS BI: CPT

## 2024-11-04 ENCOUNTER — TELEPHONE (OUTPATIENT)
Dept: VASCULAR SURGERY | Facility: CLINIC | Age: 69
End: 2024-11-04
Payer: COMMERCIAL

## 2024-11-04 NOTE — TELEPHONE ENCOUNTER
Called patient and let her know her options are to see her PCP, or Dr. Ronan Paz or Dr. Chris Garcia at [facility]. Provided the contact number to schedule if she wishes to do so. [phone]. Left message per her request.  
Caller: Rachel    Provider: MD Lilibeth GONZALEZ  Detailed reason for call: Having a flare up of lymphedema and redness on lower legs (statis dermatitis). She does not feel like the steroid cream ordered last time has helped. She would like to be seen by someone prior to Dr. Shane's return from maternity leave.     Best phone number to contact: [phone]    Best time to contact: anytime    Ok to leave a detailed message: Yes-okay to leave detailed message on voicemail.    Ok to speak to authorized person if needed: No-Talk with Rachel      (Noted to patient if reason is related to wound or incision, to please send a photo via email or [employer].)    
Normal rate, regular rhythm.  Heart sounds S1, S2.  No murmurs, rubs or gallops.

## 2024-11-13 ENCOUNTER — TELEPHONE (OUTPATIENT)
Dept: VASCULAR SURGERY | Facility: CLINIC | Age: 69
End: 2024-11-13
Payer: COMMERCIAL

## 2024-11-13 DIAGNOSIS — I89.0 ACQUIRED LYMPHEDEMA OF LOWER EXTREMITY: Primary | ICD-10-CM

## 2024-11-13 NOTE — TELEPHONE ENCOUNTER
Patient calls today requesting velcro compression vs. the compression stockings that were written for. She has an appointment with [facility] at [place]. Patient notified.

## 2024-12-03 ENCOUNTER — DOCUMENTATION ONLY (OUTPATIENT)
Dept: VASCULAR SURGERY | Facility: CLINIC | Age: 69
End: 2024-12-03
Payer: COMMERCIAL

## 2024-12-03 DIAGNOSIS — I89.0 ACQUIRED LYMPHEDEMA OF LOWER EXTREMITY: Primary | ICD-10-CM

## 2024-12-13 ENCOUNTER — TRANSFERRED RECORDS (OUTPATIENT)
Dept: HEALTH INFORMATION MANAGEMENT | Facility: CLINIC | Age: 69
End: 2024-12-13

## 2025-01-02 ENCOUNTER — TRANSCRIBE ORDERS (OUTPATIENT)
Dept: OTHER | Age: 70
End: 2025-01-02

## 2025-01-02 DIAGNOSIS — Z87.39 HISTORY OF LOW BACK PAIN: Primary | ICD-10-CM

## 2025-01-14 ENCOUNTER — OFFICE VISIT (OUTPATIENT)
Dept: VASCULAR SURGERY | Facility: CLINIC | Age: 70
End: 2025-01-14
Attending: HOSPITALIST
Payer: COMMERCIAL

## 2025-01-14 VITALS
HEART RATE: 73 BPM | SYSTOLIC BLOOD PRESSURE: 139 MMHG | RESPIRATION RATE: 18 BRPM | WEIGHT: 293 LBS | OXYGEN SATURATION: 95 % | TEMPERATURE: 98.3 F | BODY MASS INDEX: 48.82 KG/M2 | HEIGHT: 65 IN | DIASTOLIC BLOOD PRESSURE: 79 MMHG

## 2025-01-14 DIAGNOSIS — I89.0 ACQUIRED LYMPHEDEMA OF LOWER EXTREMITY: Primary | ICD-10-CM

## 2025-01-14 DIAGNOSIS — E66.01 MORBID OBESITY (H): ICD-10-CM

## 2025-01-14 DIAGNOSIS — I87.2 VENOUS (PERIPHERAL) INSUFFICIENCY: ICD-10-CM

## 2025-01-14 PROCEDURE — G0463 HOSPITAL OUTPT CLINIC VISIT: HCPCS | Performed by: HOSPITALIST

## 2025-01-14 ASSESSMENT — PAIN SCALES - GENERAL: PAINLEVEL_OUTOF10: NO PAIN (0)

## 2025-01-14 NOTE — PATIENT INSTRUCTIONS
"Windy Ramos,    Thank you for entrusting your care with us today. After your visit today with MD Lilibeth Shane this is the plan that was discussed at your appointment.    Begin wearing your compression velcro wraps daily. Put on in the am and take off at bedtime.     Follow-up with Dr. Shane in March to see how you are doing and if swelling is under control.      I am including additional information on these things and our contact information if you have any questions or concerns.   Please do not hesitate to reach out to us if you felt we did not answer your questions or you are unsure of the treatment plan after your visit today. Our number is [phone].Thank you for trusting us with your care.         Again thank you for your time.     Swelling in the legs can be caused by many reasons. No matter what the reason, treatment usually includes some type of compression. We are prescribing some compression garments for you today. Below are some locations where they can help you get measured and fitting to ensure proper fitting. You should wear your compression socks as much as you can. Your compression should be put on first thing in the morning. You may have to adjust it a couple times throughout the day. Take the compression off at night. It is especially important to wear them with long periods of sitting/standing, long car rides or if you will be flying. Going without compression for even brief periods of time can be damaging to your legs and your health.  Compression Velcro should be replaced ever 9-12 months. They do not need to be worn at night while in bed. We can refill your sock prescription for 1 year otherwise your primary is able to refill them for you. Call us with any problems or questions. If you do a lot of standing, it is good to do calf raises to help keep the blood pumping. If you sit a lot at work, it is good to get up periodically to walk around. Elevation of the foot of your bed 4-6\" helps the " blood return back to where it is needed.  Velcro compression should never hurt. If you experience any pain, immediatly remove and consult your physician. It should feel firm but comfortable. If pressure increases or decreases noticeably during wear, loosen or tighten any bands.     Please call us if you have any questions [phone]  Thank you for choosing [facility].     Velcro Compression Wraps Instructions     1) Slide leg liner or tubular compression onto the leg before applying the velcro warp.       \  2) Apply the velcro wrap over the leg liner. Pull bands to tighten for compression.   3) The top of the velcro warp should start just below the knee crease and the bottom should reach just above the ankle bone.  4) Angle each band individually to achieve a snug and wrinkle-free fit. Do not tuck the bands and the velcro should never touch the skin.  5) Lastly apply the anklet sock over the velcro wrap if instructed to do so. This should be worn over the velcro wrap due to sensitivity from the ribbed edge.  6) To remove the velcro wrap, undo each band and fold it back on itself. If done properly the garment should be ready for easy application.  7) To extend the life of velcro wraps, hand wash and hang dry. You should replace your velcro wraps once a year.         DO NOT STRAP VELCRO TO LEG LINER OR TUBRIGRIP!!!  1.2.        3.4.

## 2025-01-14 NOTE — PROGRESS NOTES
"[facility]        Patient is here for a 3-4 month follow up  to discuss BLE swelling and discoloration. The patient states he/she does not wear compressions. Swelling is observed in both lower extremities.    Pt is currently taking Statin.    /79   Pulse 73   Temp 98.3  F (36.8  C)   Resp 18   Ht 5' 5\" (1.651 m)   Wt 303 lb 4.8 oz (137.6 kg)   SpO2 95%   BMI 50.47 kg/m      The provider has been notified that the patient has concerns of BLE swelling and discoloration.     Questions patient would like addressed today are: N/A.    Refills are needed: No    Has homecare services and agency name:  No           "

## 2025-01-22 DIAGNOSIS — M54.50 LOW BACK PAIN, UNSPECIFIED BACK PAIN LATERALITY, UNSPECIFIED CHRONICITY, UNSPECIFIED WHETHER SCIATICA PRESENT: Primary | ICD-10-CM

## 2025-01-30 NOTE — PROGRESS NOTES
"VASCULAR MEDICINE PROGRESS NOTE          LOCATION:  [facility]       Date of Service: 1/14/2025      Primary Care Provider: Jose Juarez      Reason for the visit/chief complaint:   Follow up on lower extremity edema       Subjective:  Rachel Penaloza is a pleasant 69 year old female who presents to our Vascular Medicine clinic to follow up.    We first met 4 months ago 09/10/2024.  I refer the reader to my initial consultation note for details.  Briefly, the patient has longstanding bilateral lower extremity edema since her 20s for approximately 40 years.  She was noted to have chronic venous insufficiency with acquired lymphedema and significant stasis dermatitis.  She has a background of class III obesity with a BMI of 50.  We referred her to lymphedema therapy for CDT.    Today, patient reports improvement in her lower extremity swelling.  She did work with lymphedema therapy and custom made Velcro compression prescription was given to her during our last visit.  She worked with lymphedema therapy only for 3 sessions per her request.  She has not been wearing any compression therapy after she stopped working with lymphedema therapy and just received her custom made Velcro compression.        Past medical history, surgical history, medications, family history, social history and allergies were reviewed. Pertinent points mentioned under HPI.        OBJECTIVE:    Vital signs:  /79   Pulse 73   Temp 98.3  F (36.8  C)   Resp 18   Ht 5' 5\" (1.651 m)   Wt 303 lb 4.8 oz (137.6 kg)   SpO2 95%   BMI 50.47 kg/m    Wt Readings from Last 1 Encounters:   01/14/25 303 lb 4.8 oz (137.6 kg)     Body mass index is 50.47 kg/m .      Physical exam:  General appearance: Pleasant female in no apparent distress.    HEENT: NC/AT.    Extremities: Bilateral lower extremity pitting edema is noted starting from the knee down with positive stemmer's sign and prominent dorsal hump.  Skin: Stasis " dermatitis noted bilaterally with skin thickening and papillomatosis.  No increased redness or warmth.  No wounds.           9/10/2024  2:32 PM 1/14/2025  3:19 PM   Circumferential Measures     Right just above MTP 27  27    Right Ankle 33.4  32.7    Right Widest Calf 54.1  56.8    Left - just above MTP 27.5  27.4    Left Ankle 37.5  38    Left Widest Calf 59  61            DIAGNOSTIC STUDIES:   Labs and diagnostics reviewed including outside records. Pertinent points are mentioned under HPI and assessment and plan sections.        ASSESSMENT AND PLAN:  Chronic venous insufficiency with longstanding lower extremity edema  Acquired lymphedema of bilateral lower extremity  Stasis dermatitis   Obesity class III    Has not been wearing compression for almost 3 months now.  Her measurements are about the same with increased on her calves.  Now that she has custom made Velcro compression, she would like to start wearing Velcro compression daily and assess improvement for a decision is made to return to lymphedema therapy.  Was encouraged to start wearing her compression wraps every day.  Encouraged to use her lymphedema pump daily for 1 hour.  Short-term follow-up to assess Velcro compression      Recommendations:    Wear Velcro compression daily.  Lymphedema pump daily.  Use triamcinolone to the affected area of stasis dermatitis (apply a thin layer over the affected area and wash your hands after use. Use for no more than 10 days at a time).    Discussed leg elevation, calf muscle pump exercises and encouraged weight loss.  Working with weight loss clinic.  Follow-up in 2 months to assess improvement versus referral back to lymphedema therapy to continue treatment.     Was a pleasure meeting Ms. Penaloza in our clinic today.    Lilibeth Shane MD, [facility]  Vascular Medicine  January 14, 2025

## 2025-02-04 NOTE — TELEPHONE ENCOUNTER
Action February 4, 2025 1:35 PM MT   Action Taken Sent a request for imaging from [facility] and [facility].       DIAGNOSIS: History of low back pain [Z87.39]  - Primary   APPOINTMENT DATE: 02/13/2025   NOTES STATUS DETAILS   OFFICE NOTE [facility] provider [facility]ondo [facility]   DEXA In process [facility]:  07/31/2023 - Hips/Spine   MRI In process [facility]:  07/22/2021, 12/11/2013 - L Spine    [facility]:  05/18/2017 - L Spine   CT SCAN In process [facility]:  07/10/2022, 05/14/2013 - Abd/Pel   XRAYS (IMAGES & REPORTS) In process [facility]:  07/22/2021, 08/10/2017 - L Spine

## 2025-02-13 ENCOUNTER — ANCILLARY PROCEDURE (OUTPATIENT)
Dept: GENERAL RADIOLOGY | Facility: CLINIC | Age: 70
End: 2025-02-13
Attending: ORTHOPAEDIC SURGERY
Payer: COMMERCIAL

## 2025-02-13 ENCOUNTER — PRE VISIT (OUTPATIENT)
Dept: ORTHOPEDICS | Facility: CLINIC | Age: 70
End: 2025-02-13

## 2025-02-13 DIAGNOSIS — M54.50 LOW BACK PAIN, UNSPECIFIED BACK PAIN LATERALITY, UNSPECIFIED CHRONICITY, UNSPECIFIED WHETHER SCIATICA PRESENT: ICD-10-CM

## 2025-02-13 PROCEDURE — 77073 BONE LENGTH STUDIES: CPT | Performed by: STUDENT IN AN ORGANIZED HEALTH CARE EDUCATION/TRAINING PROGRAM

## 2025-02-13 PROCEDURE — 72082 X-RAY EXAM ENTIRE SPI 2/3 VW: CPT | Performed by: STUDENT IN AN ORGANIZED HEALTH CARE EDUCATION/TRAINING PROGRAM

## 2025-03-25 ENCOUNTER — OFFICE VISIT (OUTPATIENT)
Dept: VASCULAR SURGERY | Facility: CLINIC | Age: 70
End: 2025-03-25
Attending: HOSPITALIST
Payer: COMMERCIAL

## 2025-03-25 VITALS
SYSTOLIC BLOOD PRESSURE: 132 MMHG | HEART RATE: 67 BPM | OXYGEN SATURATION: 91 % | TEMPERATURE: 97.8 F | DIASTOLIC BLOOD PRESSURE: 78 MMHG

## 2025-03-25 DIAGNOSIS — E66.01 MORBID OBESITY (H): ICD-10-CM

## 2025-03-25 DIAGNOSIS — I87.2 CHRONIC VENOUS INSUFFICIENCY OF LOWER EXTREMITY: ICD-10-CM

## 2025-03-25 DIAGNOSIS — I89.0 ACQUIRED LYMPHEDEMA OF LOWER EXTREMITY: Primary | ICD-10-CM

## 2025-03-25 PROCEDURE — G0463 HOSPITAL OUTPT CLINIC VISIT: HCPCS | Performed by: HOSPITALIST

## 2025-03-25 NOTE — PROGRESS NOTES
Vascular Clinic Rooming Questions      Patient is here for  10 week follow up  for Swelling. Swelling is observed in both upper extremities and both lower extremities. The patient states he/she does not wear compressions when driving. Wears when she is at home.      /78   Pulse 67   Temp 97.8  F (36.6  C)   SpO2 (!) 91%     The provider has been notified that the patient talk about meds.     Questions patient would like addressed today are: N/A.    Refills are needed: No N/A    Has homecare services and agency name:  No

## 2025-03-25 NOTE — PATIENT INSTRUCTIONS
Windy Ramos,    Thank you for entrusting your care with us today. After your visit today with Dr. Lilibeth Shane this is the plan that was discussed at your appointment.    Wear your velcro compression DAILY.  If you absolutely need to take them off to drive, remove them right before you leave and put them back on when you get to your destination.       Use your lymphedema pump EVERY DAY for one hour.     Continue to work with weight management.     Follow up in about 3 months.     I am including additional information on these things and our contact information if you have any questions or concerns.   Please do not hesitate to reach out to us if you felt we did not answer your questions or you are unsure of the treatment plan after your visit today. Our number is [phone].Thank you for trusting us with your care.         Again thank you for your time.     Velcro Compression Wraps Instructions    1) Slide leg liner or Tubigrip onto the leg before applying the velcro wrap.     2) Apply the velcro wrap over the leg liner. Pull bands to tighten for compression.     3) The top of the velcro warp should start just below the knee crease and the bottom should reach just above the ankle bone.    4) Angle each band individually to achieve a snug and wrinkle-free fit. Do not tuck the bands and the velcro should never touch the skin.    5) Lastly apply the anklet sock over the velcro wrap if instructed to do so. This should be worn over the velcro wrap due to sensitivity from the ribbed edge.    6) To remove the velcro wrap, undo each band and fold it back on itself. If done properly the garment should be ready for easy application.    7) To extend the life of velcro wraps, hand wash and hang dry. You should replace your velcro wraps every 1-2 years.         DO NOT STRAP VELCRO TO LEG LINER OR TUBRIGRIP!!!  1.2.        3.4.    Compression wrap should  be smoothed down well and creasing or bulging avoided.   You should wear  "compression as much as you can. It is especially important to wear compression with long periods of sitting/standing, long car rides or if you will be flying.   Compression socks should get refilled every 4-6 months. Compression Velcro should be replaced every 1-2 years.   We can refill your compression prescription for 1 year otherwise your primary is able to refill them for you.  They do not need to be worn at night while in bed.   If you do a lot of standing it is good to do calf raises to help keep the blood pumping. If you sit a lot at work it is good to get up periodically to walk around. Elevation of the foot of your bed 4-6\" helps the blood return back to where it is needed.  Velcro compression should never hurt. If you experience any pain, immediatly remove and consult your physician. It should feel firm but comfortable. If pressure increases or decreases noticeably during wear, loosen or tighten any bands.           "

## 2025-03-25 NOTE — PROGRESS NOTES
VASCULAR MEDICINE PROGRESS NOTE          LOCATION:  [facility]       Date of Service: 3/25/2025      Primary Care Provider: Jose Juarez      Reason for the visit/chief complaint:   Follow up on lower extremity lymphedema       Subjective:  Rachel Penaloza is a pleasant 69 year old female who presents to our Vascular Medicine clinic to follow up.    We first met 09/10/2024.  I refer the reader to my initial consultation note for details.  Last seen 2 months ago 1/14/2025.    Briefly, Ms. Penaloza has longstanding bilateral lower extremity edema since her 20s for approximately 40 years.  She was noted to have chronic venous insufficiency with acquired lymphedema with skin thickening, papillomatosis and significant stasis dermatitis.  She has a background of class III obesity with a BMI of 50.  We referred her to lymphedema therapy for CDT. She completed only 3 sessions per her request and then transitioned to custom made Velcro compression.     Today, Ms. Penaloza reports that she has been wearing her custom made Velcro compression however not very frequent.  She wears that about 3 times a week.  On the days she is wearing them, they are typically on from 8 AM to 10 PM.  The main reason that she does not wear her Velcro compression on the other days is due to feeling restrictive while driving.  Continues to have restriction with flareup of chronic back pain.  She is due for injection tomorrow and does anticipate she will be able to move more.  She has been using her lymphedema pump approximately 1-2 times a week.    Was seen by dermatology and has been applying triamcinolone cream with improvement on bilateral shins.    Past medical history, surgical history, medications, family history, social history and allergies were reviewed. Pertinent points mentioned under HPI.        OBJECTIVE:    Vital signs:  /78   Pulse 67   Temp 97.8  F (36.6  C)   SpO2 (!) 91%   Wt Readings from Last  1 Encounters:   02/13/25 295 lb (133.8 kg)     There is no height or weight on file to calculate BMI.      Physical exam:  General appearance: Pleasant female in no apparent distress.    HEENT: NC/AT.    Extremities: Bilateral lower extremity pitting edema is noted starting from the knee down with positive stemmer's sign and prominent dorsal hump.  Skin: Stasis dermatitis noted bilaterally with skin thickening and papillomatosis. Impropved redness or warmth.  No wounds.              DIAGNOSTIC STUDIES:   Labs and diagnostics reviewed including outside records. Pertinent points are mentioned under HPI and assessment and plan sections.        ASSESSMENT AND PLAN:    Chronic venous insufficiency with longstanding lower extremity edema  Acquired lymphedema of bilateral lower extremity  Skin thickening and papillomatosis   Stasis dermatitis   Obesity class III, BMI 50    Leg swelling is improving, her numbers are about 2 cm on both calves.  We discussed that she should continue to try more frequent use of her Velcro compression as well as lymphedema pump.  Also she expects her mobility to improve as soon as her back pain improves as she is anticipating an injection tomorrow.      Recommendations:  Recommend more frequent use of Velcro compression daily.  Use lymphedema pump more frequently and daily if able for 1 hour.  Use triamcinolone to the affected area of stasis dermatitis (apply a thin layer over the affected area and wash your hands after use. Try to limit using it >14 days, ok to repeat the course).    Continue to leg elevation, calf muscle pump exercises.  Continue with weight loss measures.  Working with weight loss clinic.  Follow-up in 3 months to assess improvement.     Was a pleasure meeting Ms. Penaloza in our clinic again today.      Lilibeth Shane MD, [facility]  Vascular Medicine  March 25, 2025    The longitudinal plan of care for the diagnosis(es)/condition(s) as documented were addressed during this visit.  Due to the added complexity in care, I will continue to support Rachel in the subsequent management and with ongoing continuity of care.

## 2025-07-01 ENCOUNTER — OFFICE VISIT (OUTPATIENT)
Dept: VASCULAR SURGERY | Facility: CLINIC | Age: 70
End: 2025-07-01
Attending: HOSPITALIST
Payer: COMMERCIAL

## 2025-07-01 VITALS
DIASTOLIC BLOOD PRESSURE: 63 MMHG | HEART RATE: 73 BPM | OXYGEN SATURATION: 94 % | HEIGHT: 64 IN | BODY MASS INDEX: 50.02 KG/M2 | SYSTOLIC BLOOD PRESSURE: 122 MMHG | WEIGHT: 293 LBS | RESPIRATION RATE: 18 BRPM | TEMPERATURE: 97.5 F

## 2025-07-01 DIAGNOSIS — I89.0 ACQUIRED LYMPHEDEMA OF LOWER EXTREMITY: Primary | ICD-10-CM

## 2025-07-01 DIAGNOSIS — E66.813 OBESITY, CLASS 3 (H): ICD-10-CM

## 2025-07-01 DIAGNOSIS — I87.2 CHRONIC VENOUS INSUFFICIENCY OF LOWER EXTREMITY: ICD-10-CM

## 2025-07-01 PROCEDURE — G0463 HOSPITAL OUTPT CLINIC VISIT: HCPCS | Performed by: HOSPITALIST

## 2025-07-01 RX ORDER — METHOCARBAMOL 750 MG/1
1 TABLET ORAL WEEKLY
COMMUNITY
Start: 2025-05-28

## 2025-07-01 ASSESSMENT — PAIN SCALES - GENERAL: PAINLEVEL_OUTOF10: NO PAIN (0)

## 2025-07-01 NOTE — PROGRESS NOTES
"[facility]        Patient is here for a 3 month follow up  to discuss BLE swelling. The patient states he/she does  wear compressions, however intermittently. Using lymphedema pump 1-2 times daily 3-4 times per week.  Swelling is observed in both lower extremities.    Pt is currently taking Statin, patient unsure if it is the rosuvastatin or the simvastatin.     /63   Pulse 73   Temp 97.5  F (36.4  C)   Resp 18   Ht 5' 4\" (1.626 m)   Wt 295 lb 9.6 oz (134.1 kg)   SpO2 94%   BMI 50.74 kg/m      The provider has been notified that the patient has no concerns.     Questions patient would like addressed today are: N/A.    Refills are needed: No    Has homecare services and agency name:  No           "

## 2025-07-01 NOTE — PROGRESS NOTES
"VASCULAR MEDICINE PROGRESS NOTE          LOCATION:  [facility]       Date of Service: 7/1/2025      Primary Care Provider: Jose Juarez      Reason for the visit/chief complaint:   Follow up on acquired lymphedema of lower extremities      Subjective:  Rachel Penaloza is a pleasant 70 year old female who presents to our Vascular Medicine clinic to follow up.    We first met last year 09/10/2024.  I refer the reader to my initial consultation note for details.  Last seen 3 months ago 3/25/2025.     Briefly, Ms. Penaloza has longstanding bilateral lower extremity edema since her 20s for approximately 40 years.  She was noted to have chronic venous insufficiency with acquired lymphedema with skin thickening, papillomatosis and significant stasis dermatitis.  She has a background of class III obesity with a BMI of 50.  We referred her to lymphedema therapy for CDT. She completed only 3 sessions per her request and then transitioned to custom made Velcro compression that she was not able to wear daily due to being restrictive especially with driving.  She is lymphedema pump and has been using this frequently.      Today, Ms. Penaloza reports that she continues to be bothered by lower back pain that has been acting up the past 3 months.  Reports that she is now keeping her back pain a priority and there are plans for possible back surgery soon.  Meanwhile, she tries her best to wear her Velcro compression few times a week.  She does lymphedema pump 1-2 times a day approximately 4 days a week.  She has been applying triamcinolone cream as needed and has noted improvement in her stasis dermatitis.      Past medical history, surgical history, medications, family history, social history and allergies were reviewed. Pertinent points mentioned under HPI.        OBJECTIVE:    Vital signs:  /63   Pulse 73   Temp 97.5  F (36.4  C)   Resp 18   Ht 5' 4\" (1.626 m)   Wt 295 lb 9.6 oz (134.1 kg)  " " SpO2 94%   BMI 50.74 kg/m    Wt Readings from Last 1 Encounters:   07/01/25 295 lb 9.6 oz (134.1 kg)     Body mass index is 50.74 kg/m .      Physical exam:  General appearance: Pleasant female in no apparent distress.    Extremities: Bilateral lower extremity nonpitting edema is noted starting from the knee down with positive stemmer's sign and prominent dorsal hump.  Skin: Stasis dermatitis noted bilaterally with skin thickening and papillomatosis. Impropved redness or warmth.  No wound                  ASSESSMENT AND PLAN:    Chronic venous insufficiency with longstanding lower extremity edema, CEAP C4  Acquired lymphedema of bilateral lower extremity  Skin thickening and papillomatosis   Stasis dermatitis   Obesity class III, BMI 50  Chronic lower back pain    Leg swelling is stable to minimally worsening.  Patient is doing her best to wear Velcro compression but remains unable to wear it daily.  The recent flareup of her back pain is not helping as well.  Does use her lymphedema pump for 1-2 hours a day approximately 3-4 times a week.  We again discussed that she does need CDT.  She is currently anticipating the possibility of \"small\" back surgery after couple more back injections.  She would like to prioritize her back pain that has been disabling after which, she will plan to treat her lower extremity edema which she anticipates she will be able to go to lymphedema therapy in the fall end of December-beginning of October.  She had prior order to lymphedema therapy from last year.  We will put in a new order.  Recall that she only went for 3 sessions last time but this time she is committed.  We discussed the importance of being compliance with lymphedema therapy to see meaningful result to the point that she might be able to fit into a custom made compression stockings that would be less restrictive for her than the Velcro compression.  She is in agreement with the plan.    Meanwhile, I discussed that she " should do her best with her Velcro compression, lymphedema pump and leg elevation.  .  Recommendations:  New referral to lymphedema therapy placed today.  She anticipates starting therapy in the fall.  She will schedule accordingly.  Until then, continue wearing Velcro compression and recommend more frequent use.  Continue with lymphedema pump if able most days of the week.  Continue leg elevation, calf muscle pump exercises as able.  Weight loss will help.  Was working with weight loss clinic.  Follow-up end of the year after working with lymphedema therapy around December 2025.    Pleasure as always seeing Ms. Penaloza in our clinic today.    Lilibeth Shane MD, [facility]  Vascular Medicine  July 1, 2025    The longitudinal plan of care for the diagnosis(es)/condition(s) as documented were addressed during this visit. Due to the added complexity in care, I will continue to support Rachel in the subsequent management and with ongoing continuity of care.    I spent 30 minutes on the date of the encounter patient visit/documentation/discussion.

## 2025-07-01 NOTE — Clinical Note
Needs follow up in December 2025 or January 2026.  I believe patient would like to continue to be seen in [place] so Dr. Burns.  Follow up, BLE swelling, stockings, lymphedema therapy

## 2025-07-01 NOTE — PATIENT INSTRUCTIONS
Windy Ramos,    Thank you for entrusting your care with us today. After your visit today with Dr. Lilibeth Shane this is the plan that was discussed at your appointment.    Go to lymphedema therapy.      Wear compression daily.     Follow up in December or January with vascular medicine provider.     I am including additional information on these things and our contact information if you have any questions or concerns.   Please do not hesitate to reach out to us if you felt we did not answer your questions or you are unsure of the treatment plan after your visit today. Our number is [phone].Thank you for trusting us with your [email or web address]         Again thank you for your time.